# Patient Record
Sex: FEMALE | Race: WHITE | URBAN - METROPOLITAN AREA
[De-identification: names, ages, dates, MRNs, and addresses within clinical notes are randomized per-mention and may not be internally consistent; named-entity substitution may affect disease eponyms.]

---

## 2017-09-30 ENCOUNTER — HISTORICAL (OUTPATIENT)
Dept: ADMINISTRATIVE | Facility: HOSPITAL | Age: 82
End: 2017-09-30

## 2017-09-30 LAB
ABS NEUT (OLG): 6.24 X10(3)/MCL (ref 2.1–9.2)
ALBUMIN SERPL-MCNC: 3.7 GM/DL (ref 3.4–5)
ALBUMIN/GLOB SERPL: 0.9 {RATIO}
ALP SERPL-CCNC: 73 UNIT/L (ref 38–126)
ALT SERPL-CCNC: 17 UNIT/L (ref 12–78)
APPEARANCE, UA: CLEAR
APTT PPP: 34.5 SECOND(S) (ref 24.8–36.9)
AST SERPL-CCNC: 22 UNIT/L (ref 15–37)
BACTERIA SPEC CULT: ABNORMAL /HPF
BASOPHILS # BLD AUTO: 0 X10(3)/MCL (ref 0–0.2)
BASOPHILS NFR BLD AUTO: 0 %
BILIRUB SERPL-MCNC: 0.3 MG/DL (ref 0.2–1)
BILIRUB UR QL STRIP: NEGATIVE
BILIRUBIN DIRECT+TOT PNL SERPL-MCNC: 0.1 MG/DL (ref 0–0.2)
BILIRUBIN DIRECT+TOT PNL SERPL-MCNC: 0.2 MG/DL (ref 0–0.8)
BUN SERPL-MCNC: 22 MG/DL (ref 7–18)
CALCIUM SERPL-MCNC: 8.7 MG/DL (ref 8.5–10.1)
CHLORIDE SERPL-SCNC: 110 MMOL/L (ref 98–107)
CO2 SERPL-SCNC: 26 MMOL/L (ref 21–32)
COLOR UR: YELLOW
CREAT SERPL-MCNC: 0.96 MG/DL (ref 0.55–1.02)
EOSINOPHIL # BLD AUTO: 0.1 X10(3)/MCL (ref 0–0.9)
EOSINOPHIL NFR BLD AUTO: 1 %
ERYTHROCYTE [DISTWIDTH] IN BLOOD BY AUTOMATED COUNT: 15.7 % (ref 11.5–17)
GLOBULIN SER-MCNC: 3.9 GM/DL (ref 2.4–3.5)
GLUCOSE (UA): NEGATIVE
GLUCOSE SERPL-MCNC: 85 MG/DL (ref 74–106)
HCT VFR BLD AUTO: 51.4 % (ref 37–47)
HGB BLD-MCNC: 16.5 GM/DL (ref 12–16)
HGB UR QL STRIP: NEGATIVE
INR PPP: 1.08 (ref 0–1.27)
KETONES UR QL STRIP: NEGATIVE
LEUKOCYTE ESTERASE UR QL STRIP: ABNORMAL
LYMPHOCYTES # BLD AUTO: 1.5 X10(3)/MCL (ref 0.6–4.6)
LYMPHOCYTES NFR BLD AUTO: 17 %
MCH RBC QN AUTO: 28.8 PG (ref 27–31)
MCHC RBC AUTO-ENTMCNC: 32.1 GM/DL (ref 33–36)
MCV RBC AUTO: 89.9 FL (ref 80–94)
MONOCYTES # BLD AUTO: 0.7 X10(3)/MCL (ref 0.1–1.3)
MONOCYTES NFR BLD AUTO: 8 %
NEUTROPHILS # BLD AUTO: 6.24 X10(3)/MCL (ref 1.4–7.9)
NEUTROPHILS NFR BLD AUTO: 73 %
NITRITE UR QL STRIP: NEGATIVE
PH UR STRIP: 7 [PH] (ref 5–9)
PLATELET # BLD AUTO: 260 X10(3)/MCL (ref 130–400)
PMV BLD AUTO: 9.2 FL (ref 9.4–12.4)
POTASSIUM SERPL-SCNC: 4.4 MMOL/L (ref 3.5–5.1)
PROT SERPL-MCNC: 7.6 GM/DL (ref 6.4–8.2)
PROT UR QL STRIP: NEGATIVE
PROTHROMBIN TIME: 13.8 SECOND(S) (ref 12.2–14.7)
RBC # BLD AUTO: 5.72 X10(6)/MCL (ref 4.2–5.4)
RBC #/AREA URNS HPF: ABNORMAL /[HPF]
SODIUM SERPL-SCNC: 144 MMOL/L (ref 136–145)
SP GR UR STRIP: 1.01 (ref 1–1.03)
SQUAMOUS EPITHELIAL, UA: ABNORMAL
TROPONIN I SERPL-MCNC: <0.02 NG/ML (ref 0.02–0.49)
UROBILINOGEN UR STRIP-ACNC: 0.2
WBC # SPEC AUTO: 8.5 X10(3)/MCL (ref 4.5–11.5)
WBC #/AREA URNS HPF: ABNORMAL /[HPF]

## 2017-10-13 ENCOUNTER — HISTORICAL (OUTPATIENT)
Dept: ADMINISTRATIVE | Facility: HOSPITAL | Age: 82
End: 2017-10-13

## 2017-10-13 LAB
ABS NEUT (OLG): 14.32 X10(3)/MCL (ref 2.1–9.2)
ALBUMIN SERPL-MCNC: 3.1 GM/DL (ref 3.4–5)
ALBUMIN/GLOB SERPL: 0.6 {RATIO}
ALP SERPL-CCNC: 96 UNIT/L (ref 38–126)
ALT SERPL-CCNC: 18 UNIT/L (ref 12–78)
AMPHET UR QL SCN: NORMAL
APPEARANCE, UA: ABNORMAL
APTT PPP: 33.7 SECOND(S) (ref 24.8–36.9)
AST SERPL-CCNC: 18 UNIT/L (ref 15–37)
BACTERIA SPEC CULT: ABNORMAL /HPF
BARBITURATE SCN PRESENT UR: NORMAL
BASOPHILS # BLD AUTO: 0 X10(3)/MCL (ref 0–0.2)
BASOPHILS NFR BLD AUTO: 0 %
BENZODIAZ UR QL SCN: NORMAL
BILIRUB SERPL-MCNC: 0.8 MG/DL (ref 0.2–1)
BILIRUB UR QL STRIP: ABNORMAL
BILIRUBIN DIRECT+TOT PNL SERPL-MCNC: 0.3 MG/DL (ref 0–0.8)
BILIRUBIN DIRECT+TOT PNL SERPL-MCNC: 0.5 MG/DL (ref 0–0.2)
BUN SERPL-MCNC: 28 MG/DL (ref 7–18)
CALCIUM SERPL-MCNC: 8.6 MG/DL (ref 8.5–10.1)
CANNABINOIDS UR QL SCN: NORMAL
CHLORIDE SERPL-SCNC: 107 MMOL/L (ref 98–107)
CK MB SERPL-MCNC: 2.4 NG/ML (ref 0.5–3.6)
CK SERPL-CCNC: 107 UNIT/L (ref 26–192)
CO2 SERPL-SCNC: 18 MMOL/L (ref 21–32)
COCAINE UR QL SCN: NORMAL
COLOR UR: ABNORMAL
CREAT SERPL-MCNC: 1.2 MG/DL (ref 0.55–1.02)
EOSINOPHIL # BLD AUTO: 0 X10(3)/MCL (ref 0–0.9)
EOSINOPHIL NFR BLD AUTO: 0 %
ERYTHROCYTE [DISTWIDTH] IN BLOOD BY AUTOMATED COUNT: 15.1 % (ref 11.5–17)
GLOBULIN SER-MCNC: 5.2 GM/DL (ref 2.4–3.5)
GLUCOSE (UA): NEGATIVE
GLUCOSE SERPL-MCNC: 103 MG/DL (ref 74–106)
HCT VFR BLD AUTO: 46.1 % (ref 37–47)
HGB BLD-MCNC: 14.7 GM/DL (ref 12–16)
HGB UR QL STRIP: NEGATIVE
INR PPP: 1.27 (ref 0–1.27)
KETONES UR QL STRIP: NEGATIVE
LACTATE SERPL-SCNC: 1.1 MMOL/L (ref 0.4–2)
LEUKOCYTE ESTERASE UR QL STRIP: ABNORMAL
LYMPHOCYTES # BLD AUTO: 1.5 X10(3)/MCL (ref 0.6–4.6)
LYMPHOCYTES NFR BLD AUTO: 9 %
MAGNESIUM SERPL-MCNC: 2.2 MG/DL (ref 1.8–2.4)
MCH RBC QN AUTO: 28.1 PG (ref 27–31)
MCHC RBC AUTO-ENTMCNC: 31.9 GM/DL (ref 33–36)
MCV RBC AUTO: 88.1 FL (ref 80–94)
MONOCYTES # BLD AUTO: 1.8 X10(3)/MCL (ref 0.1–1.3)
MONOCYTES NFR BLD AUTO: 10 %
NEUTROPHILS # BLD AUTO: 14.32 X10(3)/MCL (ref 2.1–9.2)
NEUTROPHILS NFR BLD AUTO: 80 %
NITRITE UR QL STRIP: NEGATIVE
OPIATES UR QL SCN: NORMAL
PCP UR QL: NORMAL
PH UR STRIP.AUTO: 5.5 [PH] (ref 5–7.5)
PH UR STRIP: 5.5 [PH] (ref 5–9)
PLATELET # BLD AUTO: 274 X10(3)/MCL (ref 130–400)
PMV BLD AUTO: 11.1 FL (ref 9.4–12.4)
POC TROPONIN: 0 NG/ML (ref 0–0.08)
POTASSIUM SERPL-SCNC: 4.2 MMOL/L (ref 3.5–5.1)
PROT SERPL-MCNC: 8.3 GM/DL (ref 6.4–8.2)
PROT UR QL STRIP: ABNORMAL
PROTHROMBIN TIME: 15.7 SECOND(S) (ref 12.2–14.7)
RBC # BLD AUTO: 5.23 X10(6)/MCL (ref 4.2–5.4)
RBC #/AREA URNS HPF: 5 /HPF (ref 0–2)
SODIUM SERPL-SCNC: 136 MMOL/L (ref 136–145)
SP GR FLD REFRACTOMETRY: 1.02 (ref 1–1.03)
SP GR UR STRIP: 1.02 (ref 1–1.03)
SQUAMOUS EPITHELIAL, UA: 12 /HPF (ref 0–4)
TROPONIN I SERPL-MCNC: 0.02 NG/ML (ref 0.02–0.49)
TSH SERPL-ACNC: 2.31 MIU/ML (ref 0.36–3.74)
UROBILINOGEN UR STRIP-ACNC: 2
WBC # SPEC AUTO: 17.8 X10(3)/MCL (ref 4.5–11.5)
WBC #/AREA URNS HPF: 15 /HPF (ref 0–3)

## 2017-10-14 LAB
CHOLEST SERPL-MCNC: 182 MG/DL (ref 0–200)
CHOLEST/HDLC SERPL: 5.2 {RATIO} (ref 0–4)
CK MB SERPL-MCNC: 2.5 NG/ML (ref 0.5–3.6)
CK SERPL-CCNC: 89 UNIT/L (ref 26–192)
CRP SERPL HS-MCNC: >190 MG/L (ref 0–3)
ERYTHROCYTE [SEDIMENTATION RATE] IN BLOOD: 67 MM/HR (ref 0–20)
EST. AVERAGE GLUCOSE BLD GHB EST-MCNC: 111 MG/DL
HBA1C MFR BLD: 5.5 % (ref 4.2–6.3)
HCO3 UR-SCNC: 19.6 MMOL/L (ref 22–26)
HDLC SERPL-MCNC: 35 MG/DL (ref 35–60)
LDLC SERPL CALC-MCNC: 123 MG/DL (ref 0–129)
MAGNESIUM SERPL-MCNC: 2.2 MG/DL (ref 1.8–2.4)
O2 HGB ARTERIAL: 87.6 % (ref 94–97)
PCO2 BLDA: 38 MMHG (ref 35–45)
PH SMN: 7.32 [PH] (ref 7.35–7.45)
PO2 BLDA: 51 MMHG (ref 80–100)
POC ALLENS TEST: ABNORMAL
POC BE: -6 (ref -2–2)
POC CAO2: 17.9 ML/DL (ref 15.7–21.6)
POC CO HGB: 2.7 %
POC CO2: 20.8 MMOL/L (ref 22–27)
POC IONIZED CALCIUM: 1.09 MMOL/L (ref 1.12–1.23)
POC MET HGB: 1.2 % (ref 0.4–1.5)
POC SAMPLESOURCE: ABNORMAL
POC SATURATED O2: 82.3 % (ref 95–98)
POC SITE: ABNORMAL
POC THB: 14.6 GM/DL (ref 12–16)
POC TREATMENT: ABNORMAL
POTASSIUM BLD-SCNC: 4.3 MMOL/L (ref 3.6–5)
SETTING 1 ABG: ABNORMAL
SETTING 2 ABG: ABNORMAL
SODIUM BLD-SCNC: 138 MMOL/L (ref 137–145)
TRIGL SERPL-MCNC: 122 MG/DL (ref 30–150)
TROPONIN I SERPL-MCNC: 0.09 NG/ML (ref 0.02–0.49)
VLDLC SERPL CALC-MCNC: 24 MG/DL

## 2017-10-16 LAB
ABS NEUT (OLG): 11.42 X10(3)/MCL (ref 2.1–9.2)
BASOPHILS # BLD AUTO: 0 X10(3)/MCL (ref 0–0.2)
BASOPHILS NFR BLD AUTO: 0 %
BUN SERPL-MCNC: 30 MG/DL (ref 7–18)
CALCIUM SERPL-MCNC: 8.9 MG/DL (ref 8.5–10.1)
CHLORIDE SERPL-SCNC: 108 MMOL/L (ref 98–107)
CO2 SERPL-SCNC: 23 MMOL/L (ref 21–32)
CREAT SERPL-MCNC: 0.82 MG/DL (ref 0.55–1.02)
ERYTHROCYTE [DISTWIDTH] IN BLOOD BY AUTOMATED COUNT: 14.5 % (ref 11.5–17)
FINAL CULTURE: NO GROWTH
GLUCOSE SERPL-MCNC: 123 MG/DL (ref 74–106)
HCT VFR BLD AUTO: 45.3 % (ref 37–47)
HGB BLD-MCNC: 14.7 GM/DL (ref 12–16)
LYMPHOCYTES # BLD AUTO: 0.9 X10(3)/MCL (ref 0.6–4.6)
LYMPHOCYTES NFR BLD AUTO: 7 %
MCH RBC QN AUTO: 29.2 PG (ref 27–31)
MCHC RBC AUTO-ENTMCNC: 32.5 GM/DL (ref 33–36)
MCV RBC AUTO: 89.9 FL (ref 80–94)
MONOCYTES # BLD AUTO: 0.3 X10(3)/MCL (ref 0.1–1.3)
MONOCYTES NFR BLD AUTO: 2 %
NEUTROPHILS # BLD AUTO: 11.42 X10(3)/MCL (ref 1.4–7.9)
NEUTROPHILS NFR BLD AUTO: 90 %
PLATELET # BLD AUTO: 296 X10(3)/MCL (ref 130–400)
PMV BLD AUTO: 10.3 FL (ref 9.4–12.4)
POTASSIUM SERPL-SCNC: 4.3 MMOL/L (ref 3.5–5.1)
RBC # BLD AUTO: 5.04 X10(6)/MCL (ref 4.2–5.4)
SODIUM SERPL-SCNC: 142 MMOL/L (ref 136–145)
WBC # SPEC AUTO: 12.8 X10(3)/MCL (ref 4.5–11.5)

## 2017-10-26 ENCOUNTER — HISTORICAL (OUTPATIENT)
Dept: ADMINISTRATIVE | Facility: HOSPITAL | Age: 82
End: 2017-10-26

## 2017-12-09 LAB
INFLUENZA A ANTIGEN, POC: POSITIVE
INFLUENZA B ANTIGEN, POC: NEGATIVE

## 2018-01-30 ENCOUNTER — HISTORICAL (OUTPATIENT)
Dept: ADMINISTRATIVE | Facility: HOSPITAL | Age: 83
End: 2018-01-30

## 2018-01-30 LAB — TSH SERPL-ACNC: 2.38 MIU/ML (ref 0.36–3.74)

## 2018-02-04 ENCOUNTER — HISTORICAL (OUTPATIENT)
Dept: ADMINISTRATIVE | Facility: HOSPITAL | Age: 83
End: 2018-02-04

## 2018-02-10 ENCOUNTER — HISTORICAL (OUTPATIENT)
Dept: ADMINISTRATIVE | Facility: HOSPITAL | Age: 83
End: 2018-02-10

## 2018-04-16 ENCOUNTER — HISTORICAL (OUTPATIENT)
Dept: ADMINISTRATIVE | Facility: HOSPITAL | Age: 83
End: 2018-04-16

## 2018-04-16 LAB — POC CREATININE: 1 MG/DL (ref 0.6–1.3)

## 2018-07-24 ENCOUNTER — HISTORICAL (OUTPATIENT)
Dept: ADMINISTRATIVE | Facility: HOSPITAL | Age: 83
End: 2018-07-24

## 2018-07-24 LAB
ABS NEUT (OLG): 7.33 X10(3)/MCL (ref 2.1–9.2)
ALBUMIN SERPL-MCNC: 3.8 GM/DL (ref 3.4–5)
ALBUMIN/GLOB SERPL: 1.1 RATIO (ref 1.1–2)
ALP SERPL-CCNC: 70 UNIT/L (ref 38–126)
ALT SERPL-CCNC: 16 UNIT/L (ref 12–78)
APPEARANCE, UA: CLEAR
AST SERPL-CCNC: 17 UNIT/L (ref 15–37)
BACTERIA SPEC CULT: ABNORMAL /HPF
BASOPHILS # BLD AUTO: 0 X10(3)/MCL (ref 0–0.2)
BASOPHILS NFR BLD AUTO: 0 %
BILIRUB SERPL-MCNC: 1.2 MG/DL (ref 0.2–1)
BILIRUB UR QL STRIP: NEGATIVE
BILIRUBIN DIRECT+TOT PNL SERPL-MCNC: 0.1 MG/DL (ref 0–0.5)
BILIRUBIN DIRECT+TOT PNL SERPL-MCNC: 1.1 MG/DL (ref 0–0.8)
BUN SERPL-MCNC: 19 MG/DL (ref 7–18)
CALCIUM SERPL-MCNC: 9 MG/DL (ref 8.5–10.1)
CHLORIDE SERPL-SCNC: 107 MMOL/L (ref 98–107)
CO2 SERPL-SCNC: 27 MMOL/L (ref 21–32)
COLOR UR: YELLOW
CREAT SERPL-MCNC: 0.92 MG/DL (ref 0.55–1.02)
EOSINOPHIL # BLD AUTO: 0.1 X10(3)/MCL (ref 0–0.9)
EOSINOPHIL NFR BLD AUTO: 1 %
ERYTHROCYTE [DISTWIDTH] IN BLOOD BY AUTOMATED COUNT: 15.9 % (ref 11.5–17)
GLOBULIN SER-MCNC: 3.5 GM/DL (ref 2.4–3.5)
GLUCOSE (UA): NEGATIVE
GLUCOSE SERPL-MCNC: 101 MG/DL (ref 74–106)
HCT VFR BLD AUTO: 46.5 % (ref 37–47)
HGB BLD-MCNC: 14.4 GM/DL (ref 12–16)
HGB UR QL STRIP: NEGATIVE
KETONES UR QL STRIP: NEGATIVE
LEUKOCYTE ESTERASE UR QL STRIP: ABNORMAL
LYMPHOCYTES # BLD AUTO: 1.8 X10(3)/MCL (ref 0.6–4.6)
LYMPHOCYTES NFR BLD AUTO: 18 %
MCH RBC QN AUTO: 27.1 PG (ref 27–31)
MCHC RBC AUTO-ENTMCNC: 31 GM/DL (ref 33–36)
MCV RBC AUTO: 87.4 FL (ref 80–94)
MONOCYTES # BLD AUTO: 0.9 X10(3)/MCL (ref 0.1–1.3)
MONOCYTES NFR BLD AUTO: 8 %
NEUTROPHILS # BLD AUTO: 7.33 X10(3)/MCL (ref 1.4–7.9)
NEUTROPHILS NFR BLD AUTO: 72 %
NITRITE UR QL STRIP: NEGATIVE
PH UR STRIP: 5 [PH] (ref 5–9)
PLATELET # BLD AUTO: 283 X10(3)/MCL (ref 130–400)
PMV BLD AUTO: 10.4 FL (ref 9.4–12.4)
POTASSIUM SERPL-SCNC: 4.1 MMOL/L (ref 3.5–5.1)
PROT SERPL-MCNC: 7.3 GM/DL (ref 6.4–8.2)
PROT UR QL STRIP: NEGATIVE
RBC # BLD AUTO: 5.32 X10(6)/MCL (ref 4.2–5.4)
RBC #/AREA URNS HPF: ABNORMAL /[HPF]
SODIUM SERPL-SCNC: 142 MMOL/L (ref 136–145)
SP GR UR STRIP: 1.01 (ref 1–1.03)
SQUAMOUS EPITHELIAL, UA: ABNORMAL
TSH SERPL-ACNC: 0.5 MIU/L (ref 0.36–3.74)
UROBILINOGEN UR STRIP-ACNC: 0.2
WBC # SPEC AUTO: 10.2 X10(3)/MCL (ref 4.5–11.5)
WBC #/AREA URNS HPF: ABNORMAL /HPF

## 2018-12-31 ENCOUNTER — HISTORICAL (OUTPATIENT)
Dept: ADMINISTRATIVE | Facility: HOSPITAL | Age: 83
End: 2018-12-31

## 2018-12-31 LAB
ABS NEUT (OLG): 17.08 X10(3)/MCL (ref 2.1–9.2)
ALBUMIN SERPL-MCNC: 3.5 GM/DL (ref 3.4–5)
ALBUMIN/GLOB SERPL: 0.9 RATIO (ref 1.1–2)
ALP SERPL-CCNC: 75 UNIT/L (ref 38–126)
ALT SERPL-CCNC: 13 UNIT/L (ref 12–78)
AST SERPL-CCNC: 14 UNIT/L (ref 15–37)
BASOPHILS # BLD AUTO: 0 X10(3)/MCL (ref 0–0.2)
BASOPHILS NFR BLD AUTO: 0 %
BILIRUB SERPL-MCNC: 0.8 MG/DL (ref 0.2–1)
BILIRUBIN DIRECT+TOT PNL SERPL-MCNC: 0.2 MG/DL (ref 0–0.5)
BILIRUBIN DIRECT+TOT PNL SERPL-MCNC: 0.6 MG/DL (ref 0–0.8)
BNP BLD-MCNC: 527 PG/ML (ref 0–125)
BUN SERPL-MCNC: 22 MG/DL (ref 7–18)
CALCIUM SERPL-MCNC: 8.7 MG/DL (ref 8.5–10.1)
CHLORIDE SERPL-SCNC: 105 MMOL/L (ref 98–107)
CO2 SERPL-SCNC: 26 MMOL/L (ref 21–32)
CREAT SERPL-MCNC: 1.16 MG/DL (ref 0.55–1.02)
ERYTHROCYTE [DISTWIDTH] IN BLOOD BY AUTOMATED COUNT: 18 % (ref 11.5–17)
GLOBULIN SER-MCNC: 3.9 GM/DL (ref 2.4–3.5)
GLUCOSE SERPL-MCNC: 108 MG/DL (ref 74–106)
HCT VFR BLD AUTO: 44.9 % (ref 37–47)
HGB BLD-MCNC: 13.8 GM/DL (ref 12–16)
LACTATE SERPL-SCNC: 1.5 MMOL/L (ref 0.4–2)
LYMPHOCYTES # BLD AUTO: 2.4 X10(3)/MCL (ref 0.6–4.6)
LYMPHOCYTES NFR BLD AUTO: 11 %
MCH RBC QN AUTO: 25.3 PG (ref 27–31)
MCHC RBC AUTO-ENTMCNC: 30.7 GM/DL (ref 33–36)
MCV RBC AUTO: 82.2 FL (ref 80–94)
MONOCYTES # BLD AUTO: 1.8 X10(3)/MCL (ref 0.1–1.3)
MONOCYTES NFR BLD AUTO: 8 %
NEUTROPHILS # BLD AUTO: 17.08 X10(3)/MCL (ref 2.1–9.2)
NEUTROPHILS NFR BLD AUTO: 79 %
PLATELET # BLD AUTO: 238 X10(3)/MCL (ref 130–400)
PMV BLD AUTO: 9.5 FL (ref 9.4–12.4)
POTASSIUM SERPL-SCNC: 3.4 MMOL/L (ref 3.5–5.1)
PROT SERPL-MCNC: 7.4 GM/DL (ref 6.4–8.2)
RBC # BLD AUTO: 5.46 X10(6)/MCL (ref 4.2–5.4)
SODIUM SERPL-SCNC: 139 MMOL/L (ref 136–145)
TROPONIN I SERPL-MCNC: <0.02 NG/ML (ref 0.02–0.49)
WBC # SPEC AUTO: 21.5 X10(3)/MCL (ref 4.5–11.5)

## 2019-01-01 LAB
ABS NEUT (OLG): 13.43 X10(3)/MCL (ref 2.1–9.2)
BASOPHILS # BLD AUTO: 0 X10(3)/MCL (ref 0–0.2)
BASOPHILS NFR BLD AUTO: 0 %
BUN SERPL-MCNC: 23 MG/DL (ref 7–18)
CALCIUM SERPL-MCNC: 8.4 MG/DL (ref 8.5–10.1)
CHLORIDE SERPL-SCNC: 110 MMOL/L (ref 98–107)
CO2 SERPL-SCNC: 22 MMOL/L (ref 21–32)
CREAT SERPL-MCNC: 0.88 MG/DL (ref 0.55–1.02)
CREAT/UREA NIT SERPL: 26.1
ERYTHROCYTE [DISTWIDTH] IN BLOOD BY AUTOMATED COUNT: 17.6 % (ref 11.5–17)
FLUAV AG UPPER RESP QL IA.RAPID: NEGATIVE
FLUBV AG UPPER RESP QL IA.RAPID: NEGATIVE
GLUCOSE SERPL-MCNC: 133 MG/DL (ref 74–106)
HCT VFR BLD AUTO: 44.5 % (ref 37–47)
HGB BLD-MCNC: 13.4 GM/DL (ref 12–16)
LYMPHOCYTES # BLD AUTO: 0.9 X10(3)/MCL (ref 0.6–4.6)
LYMPHOCYTES NFR BLD AUTO: 6 %
MCH RBC QN AUTO: 24.9 PG (ref 27–31)
MCHC RBC AUTO-ENTMCNC: 30.1 GM/DL (ref 33–36)
MCV RBC AUTO: 82.7 FL (ref 80–94)
MONOCYTES # BLD AUTO: 0.3 X10(3)/MCL (ref 0.1–1.3)
MONOCYTES NFR BLD AUTO: 2 %
NEUTROPHILS # BLD AUTO: 13.43 X10(3)/MCL (ref 2.1–9.2)
NEUTROPHILS NFR BLD AUTO: 91 %
PLATELET # BLD AUTO: 218 X10(3)/MCL (ref 130–400)
PMV BLD AUTO: 9.5 FL (ref 9.4–12.4)
POTASSIUM SERPL-SCNC: 4.2 MMOL/L (ref 3.5–5.1)
RBC # BLD AUTO: 5.38 X10(6)/MCL (ref 4.2–5.4)
SODIUM SERPL-SCNC: 141 MMOL/L (ref 136–145)
WBC # SPEC AUTO: 14.7 X10(3)/MCL (ref 4.5–11.5)

## 2019-01-02 LAB
ABS NEUT (OLG): 12.1 X10(3)/MCL (ref 2.1–9.2)
BASOPHILS # BLD AUTO: 0 X10(3)/MCL (ref 0–0.2)
BASOPHILS NFR BLD AUTO: 0 %
BUN SERPL-MCNC: 27 MG/DL (ref 7–18)
CALCIUM SERPL-MCNC: 8.1 MG/DL (ref 8.5–10.1)
CHLORIDE SERPL-SCNC: 111 MMOL/L (ref 98–107)
CO2 SERPL-SCNC: 24 MMOL/L (ref 21–32)
CREAT SERPL-MCNC: 0.87 MG/DL (ref 0.55–1.02)
CREAT/UREA NIT SERPL: 31
ERYTHROCYTE [DISTWIDTH] IN BLOOD BY AUTOMATED COUNT: 17.5 % (ref 11.5–17)
GLUCOSE SERPL-MCNC: 158 MG/DL (ref 74–106)
HCT VFR BLD AUTO: 39.7 % (ref 37–47)
HGB BLD-MCNC: 12.2 GM/DL (ref 12–16)
LYMPHOCYTES # BLD AUTO: 0.8 X10(3)/MCL (ref 0.6–4.6)
LYMPHOCYTES NFR BLD AUTO: 6 %
MCH RBC QN AUTO: 25.1 PG (ref 27–31)
MCHC RBC AUTO-ENTMCNC: 30.7 GM/DL (ref 33–36)
MCV RBC AUTO: 81.7 FL (ref 80–94)
MONOCYTES # BLD AUTO: 0.4 X10(3)/MCL (ref 0.1–1.3)
MONOCYTES NFR BLD AUTO: 3 %
NEUTROPHILS # BLD AUTO: 12.1 X10(3)/MCL (ref 2.1–9.2)
NEUTROPHILS NFR BLD AUTO: 90 %
PLATELET # BLD AUTO: 255 X10(3)/MCL (ref 130–400)
PMV BLD AUTO: 10.7 FL (ref 9.4–12.4)
POTASSIUM SERPL-SCNC: 3.6 MMOL/L (ref 3.5–5.1)
RBC # BLD AUTO: 4.86 X10(6)/MCL (ref 4.2–5.4)
SODIUM SERPL-SCNC: 142 MMOL/L (ref 136–145)
WBC # SPEC AUTO: 13.4 X10(3)/MCL (ref 4.5–11.5)

## 2019-01-06 LAB
FINAL CULTURE: NORMAL
FINAL CULTURE: NORMAL

## 2019-01-24 ENCOUNTER — HISTORICAL (OUTPATIENT)
Dept: ADMINISTRATIVE | Facility: HOSPITAL | Age: 84
End: 2019-01-24

## 2019-01-24 LAB
ABS NEUT (OLG): 4.79 X10(3)/MCL (ref 2.1–9.2)
ALBUMIN SERPL-MCNC: 3.5 GM/DL (ref 3.4–5)
ALBUMIN/GLOB SERPL: 1.1 RATIO (ref 1.1–2)
ALP SERPL-CCNC: 73 UNIT/L (ref 38–126)
ALT SERPL-CCNC: 14 UNIT/L (ref 12–78)
ANISOCYTOSIS BLD QL SMEAR: 1
AST SERPL-CCNC: 12 UNIT/L (ref 15–37)
BASOPHILS # BLD AUTO: 0 X10(3)/MCL (ref 0–0.2)
BASOPHILS NFR BLD AUTO: 0 %
BILIRUB SERPL-MCNC: 0.6 MG/DL (ref 0.2–1)
BILIRUBIN DIRECT+TOT PNL SERPL-MCNC: 0.1 MG/DL (ref 0–0.5)
BILIRUBIN DIRECT+TOT PNL SERPL-MCNC: 0.5 MG/DL (ref 0–0.8)
BUN SERPL-MCNC: 24 MG/DL (ref 7–18)
BURR CELLS BLD QL SMEAR: 1
CALCIUM SERPL-MCNC: 8.3 MG/DL (ref 8.5–10.1)
CHLORIDE SERPL-SCNC: 107 MMOL/L (ref 98–107)
CO2 SERPL-SCNC: 27 MMOL/L (ref 21–32)
CREAT SERPL-MCNC: 0.96 MG/DL (ref 0.55–1.02)
EOSINOPHIL # BLD AUTO: 0.2 X10(3)/MCL (ref 0–0.9)
EOSINOPHIL NFR BLD AUTO: 2 %
ERYTHROCYTE [DISTWIDTH] IN BLOOD BY AUTOMATED COUNT: 19.1 % (ref 11.5–17)
GLOBULIN SER-MCNC: 3.2 GM/DL (ref 2.4–3.5)
GLUCOSE SERPL-MCNC: 86 MG/DL (ref 74–106)
HCT VFR BLD AUTO: 44.9 % (ref 37–47)
HGB BLD-MCNC: 13.2 GM/DL (ref 12–16)
HYPOCHROMIA BLD QL SMEAR: 0
LYMPHOCYTES # BLD AUTO: 1.7 X10(3)/MCL (ref 0.6–4.6)
LYMPHOCYTES NFR BLD AUTO: 23 % (ref 13–40)
MACROCYTES BLD QL SMEAR: 0
MCH RBC QN AUTO: 25.3 PG (ref 27–31)
MCHC RBC AUTO-ENTMCNC: 29.4 GM/DL (ref 33–36)
MCV RBC AUTO: 86 FL (ref 80–94)
MICROCYTES BLD QL SMEAR: 0
MONOCYTES # BLD AUTO: 0.8 X10(3)/MCL (ref 0.1–1.3)
MONOCYTES NFR BLD AUTO: 11 %
NEUTROPHILS # BLD AUTO: 4.79 X10(3)/MCL (ref 2.1–9.2)
NEUTROPHILS NFR BLD AUTO: 64 %
PLATELET # BLD AUTO: 260 X10(3)/MCL (ref 130–400)
PLATELET # BLD EST: NORMAL 10*3/UL
PMV BLD AUTO: 10.5 FL (ref 7.4–10.4)
POIKILOCYTOSIS BLD QL SMEAR: 1
POLYCHROMASIA BLD QL SMEAR: 0
POTASSIUM SERPL-SCNC: 4.4 MMOL/L (ref 3.5–5.1)
PROT SERPL-MCNC: 6.7 GM/DL (ref 6.4–8.2)
RBC # BLD AUTO: 5.22 X10(6)/MCL (ref 4.2–5.4)
SODIUM SERPL-SCNC: 141 MMOL/L (ref 136–145)
TSH SERPL-ACNC: 2.42 MIU/L (ref 0.36–3.74)
WBC # SPEC AUTO: 7.5 X10(3)/MCL (ref 4.5–11.5)

## 2019-02-11 ENCOUNTER — HISTORICAL (OUTPATIENT)
Dept: ADMINISTRATIVE | Facility: HOSPITAL | Age: 84
End: 2019-02-11

## 2019-02-11 LAB
ABS NEUT (OLG): 8.88 X10(3)/MCL (ref 2.1–9.2)
ALBUMIN SERPL-MCNC: 3.2 GM/DL (ref 3.4–5)
ALBUMIN/GLOB SERPL: 0.9 RATIO (ref 1.1–2)
ALP SERPL-CCNC: 69 UNIT/L (ref 38–126)
ALT SERPL-CCNC: 13 UNIT/L (ref 12–78)
ANISOCYTOSIS BLD QL SMEAR: 1
AST SERPL-CCNC: 18 UNIT/L (ref 15–37)
BASOPHILS # BLD AUTO: 0 X10(3)/MCL (ref 0–0.2)
BASOPHILS NFR BLD AUTO: 0 %
BILIRUB SERPL-MCNC: 0.4 MG/DL (ref 0.2–1)
BILIRUBIN DIRECT+TOT PNL SERPL-MCNC: 0.1 MG/DL (ref 0–0.5)
BILIRUBIN DIRECT+TOT PNL SERPL-MCNC: 0.3 MG/DL (ref 0–0.8)
BNP BLD-MCNC: 286 PG/ML (ref 0–125)
BUN SERPL-MCNC: 16 MG/DL (ref 7–18)
CALCIUM SERPL-MCNC: 8.5 MG/DL (ref 8.5–10.1)
CHLORIDE SERPL-SCNC: 109 MMOL/L (ref 98–107)
CO2 SERPL-SCNC: 25 MMOL/L (ref 21–32)
CREAT SERPL-MCNC: 0.81 MG/DL (ref 0.55–1.02)
D DIMER PPP IA.FEU-MCNC: 847 NG/ML FEU (ref 0–500)
EOSINOPHIL # BLD AUTO: 0.2 X10(3)/MCL (ref 0–0.9)
EOSINOPHIL NFR BLD AUTO: 1 %
ERYTHROCYTE [DISTWIDTH] IN BLOOD BY AUTOMATED COUNT: 18.6 % (ref 11.5–17)
GLOBULIN SER-MCNC: 3.4 GM/DL (ref 2.4–3.5)
GLUCOSE SERPL-MCNC: 84 MG/DL (ref 74–106)
HCT VFR BLD AUTO: 43.1 % (ref 37–47)
HGB BLD-MCNC: 12.7 GM/DL (ref 12–16)
LYMPHOCYTES # BLD AUTO: 2.3 X10(3)/MCL (ref 0.6–4.6)
LYMPHOCYTES NFR BLD AUTO: 18 % (ref 13–40)
MCH RBC QN AUTO: 25.6 PG (ref 27–31)
MCHC RBC AUTO-ENTMCNC: 29.5 GM/DL (ref 33–36)
MCV RBC AUTO: 86.7 FL (ref 80–94)
MONOCYTES # BLD AUTO: 1.7 X10(3)/MCL (ref 0.1–1.3)
MONOCYTES NFR BLD AUTO: 13 %
NEUTROPHILS # BLD AUTO: 8.88 X10(3)/MCL (ref 2.1–9.2)
NEUTROPHILS NFR BLD AUTO: 67 %
PLATELET # BLD AUTO: 292 X10(3)/MCL (ref 130–400)
PLATELET # BLD EST: NORMAL 10*3/UL
PMV BLD AUTO: 10.1 FL (ref 7.4–10.4)
POTASSIUM SERPL-SCNC: 4.5 MMOL/L (ref 3.5–5.1)
PROT SERPL-MCNC: 6.6 GM/DL (ref 6.4–8.2)
RBC # BLD AUTO: 4.97 X10(6)/MCL (ref 4.2–5.4)
SODIUM SERPL-SCNC: 141 MMOL/L (ref 136–145)
WBC # SPEC AUTO: 13.2 X10(3)/MCL (ref 4.5–11.5)

## 2019-02-12 ENCOUNTER — HISTORICAL (OUTPATIENT)
Dept: ADMINISTRATIVE | Facility: HOSPITAL | Age: 84
End: 2019-02-12

## 2019-02-22 ENCOUNTER — HISTORICAL (OUTPATIENT)
Dept: ADMINISTRATIVE | Facility: HOSPITAL | Age: 84
End: 2019-02-22

## 2019-02-22 LAB
ABS NEUT (OLG): 10.5 X10(3)/MCL (ref 2.1–9.2)
ALBUMIN SERPL-MCNC: 3.3 GM/DL (ref 3.4–5)
ALBUMIN/GLOB SERPL: 0.9 {RATIO}
ALP SERPL-CCNC: 63 UNIT/L (ref 38–126)
ALT SERPL-CCNC: 16 UNIT/L (ref 12–78)
AST SERPL-CCNC: 21 UNIT/L (ref 15–37)
BASOPHILS # BLD AUTO: 0.1 X10(3)/MCL (ref 0–0.2)
BASOPHILS NFR BLD AUTO: 0 %
BILIRUB SERPL-MCNC: 0.5 MG/DL (ref 0.2–1)
BILIRUBIN DIRECT+TOT PNL SERPL-MCNC: 0.1 MG/DL (ref 0–0.2)
BILIRUBIN DIRECT+TOT PNL SERPL-MCNC: 0.4 MG/DL (ref 0–0.8)
BNP BLD-MCNC: 161 PG/ML (ref 0–125)
BNP BLD-MCNC: 287 PG/ML (ref 0–100)
BUN SERPL-MCNC: 19 MG/DL (ref 7–18)
CALCIUM SERPL-MCNC: 8.2 MG/DL (ref 8.5–10.1)
CHLORIDE SERPL-SCNC: 103 MMOL/L (ref 98–107)
CO2 SERPL-SCNC: 26 MMOL/L (ref 21–32)
CREAT SERPL-MCNC: 0.99 MG/DL (ref 0.55–1.02)
EOSINOPHIL # BLD AUTO: 0.9 X10(3)/MCL (ref 0–0.9)
EOSINOPHIL NFR BLD AUTO: 6 %
ERYTHROCYTE [DISTWIDTH] IN BLOOD BY AUTOMATED COUNT: 17.8 % (ref 11.5–17)
GLOBULIN SER-MCNC: 3.7 GM/DL (ref 2.4–3.5)
GLUCOSE SERPL-MCNC: 91 MG/DL (ref 74–106)
HCT VFR BLD AUTO: 43.7 % (ref 37–47)
HGB BLD-MCNC: 13.4 GM/DL (ref 12–16)
LYMPHOCYTES # BLD AUTO: 1.5 X10(3)/MCL (ref 0.6–4.6)
LYMPHOCYTES NFR BLD AUTO: 10 %
MCH RBC QN AUTO: 25.3 PG (ref 27–31)
MCHC RBC AUTO-ENTMCNC: 30.7 GM/DL (ref 33–36)
MCV RBC AUTO: 82.6 FL (ref 80–94)
MONOCYTES # BLD AUTO: 1.8 X10(3)/MCL (ref 0.1–1.3)
MONOCYTES NFR BLD AUTO: 12 %
NEUTROPHILS # BLD AUTO: 10.5 X10(3)/MCL (ref 2.1–9.2)
NEUTROPHILS NFR BLD AUTO: 71 %
PLATELET # BLD AUTO: 282 X10(3)/MCL (ref 130–400)
PMV BLD AUTO: 9.5 FL (ref 9.4–12.4)
POC TROPONIN: 0.01 NG/ML (ref 0–0.08)
POTASSIUM SERPL-SCNC: 4.3 MMOL/L (ref 3.5–5.1)
PROT SERPL-MCNC: 7 GM/DL (ref 6.4–8.2)
RBC # BLD AUTO: 5.29 X10(6)/MCL (ref 4.2–5.4)
SODIUM SERPL-SCNC: 136 MMOL/L (ref 136–145)
WBC # SPEC AUTO: 14.9 X10(3)/MCL (ref 4.5–11.5)

## 2019-02-28 LAB
FINAL CULTURE: NORMAL
FINAL CULTURE: NORMAL

## 2019-03-10 ENCOUNTER — HISTORICAL (OUTPATIENT)
Dept: ADMINISTRATIVE | Facility: HOSPITAL | Age: 84
End: 2019-03-10

## 2019-03-10 LAB
ABS NEUT (OLG): 11.75 X10(3)/MCL (ref 2.1–9.2)
ALBUMIN SERPL-MCNC: 3.1 GM/DL (ref 3.4–5)
ALBUMIN/GLOB SERPL: 0.8 {RATIO}
ALP SERPL-CCNC: 60 UNIT/L (ref 38–126)
ALT SERPL-CCNC: 18 UNIT/L (ref 12–78)
APPEARANCE, UA: CLEAR
AST SERPL-CCNC: 14 UNIT/L (ref 15–37)
BACTERIA SPEC CULT: ABNORMAL /HPF
BASOPHILS # BLD AUTO: 0 X10(3)/MCL (ref 0–0.2)
BASOPHILS NFR BLD AUTO: 0 %
BILIRUB SERPL-MCNC: 0.3 MG/DL (ref 0.2–1)
BILIRUB UR QL STRIP: NEGATIVE
BILIRUBIN DIRECT+TOT PNL SERPL-MCNC: 0.1 MG/DL (ref 0–0.2)
BILIRUBIN DIRECT+TOT PNL SERPL-MCNC: 0.2 MG/DL (ref 0–0.8)
BNP BLD-MCNC: 301 PG/ML (ref 0–100)
BUN SERPL-MCNC: 14 MG/DL (ref 7–18)
CALCIUM SERPL-MCNC: 8 MG/DL (ref 8.5–10.1)
CHLORIDE SERPL-SCNC: 106 MMOL/L (ref 98–107)
CO2 SERPL-SCNC: 27 MMOL/L (ref 21–32)
COLOR UR: YELLOW
CREAT SERPL-MCNC: 0.95 MG/DL (ref 0.55–1.02)
EOSINOPHIL # BLD AUTO: 0.4 X10(3)/MCL (ref 0–0.9)
EOSINOPHIL NFR BLD AUTO: 3 %
ERYTHROCYTE [DISTWIDTH] IN BLOOD BY AUTOMATED COUNT: 18.6 % (ref 11.5–17)
FLUAV AG UPPER RESP QL IA.RAPID: NEGATIVE
FLUBV AG UPPER RESP QL IA.RAPID: NEGATIVE
GLOBULIN SER-MCNC: 4.1 GM/DL (ref 2.4–3.5)
GLUCOSE (UA): ABNORMAL
GLUCOSE SERPL-MCNC: 95 MG/DL (ref 74–106)
HCO3 UR-SCNC: 25 MMOL/L (ref 22–26)
HCT VFR BLD AUTO: 43.7 % (ref 37–47)
HGB BLD-MCNC: 13.4 GM/DL (ref 12–16)
HGB UR QL STRIP: NEGATIVE
KETONES UR QL STRIP: NEGATIVE
LEUKOCYTE ESTERASE UR QL STRIP: NEGATIVE
LYMPHOCYTES # BLD AUTO: 1.1 X10(3)/MCL (ref 0.6–4.6)
LYMPHOCYTES NFR BLD AUTO: 8 %
MCH RBC QN AUTO: 25.9 PG (ref 27–31)
MCHC RBC AUTO-ENTMCNC: 30.7 GM/DL (ref 33–36)
MCV RBC AUTO: 84.4 FL (ref 80–94)
MONOCYTES # BLD AUTO: 1 X10(3)/MCL (ref 0.1–1.3)
MONOCYTES NFR BLD AUTO: 7 %
NEUTROPHILS # BLD AUTO: 11.75 X10(3)/MCL (ref 2.1–9.2)
NEUTROPHILS NFR BLD AUTO: 82 %
NITRITE UR QL STRIP: NEGATIVE
PCO2 BLDA: 32 MMHG (ref 35–45)
PH SMN: 7.5 [PH] (ref 7.35–7.45)
PH UR STRIP: 7 [PH] (ref 5–9)
PLATELET # BLD AUTO: 265 X10(3)/MCL (ref 130–400)
PMV BLD AUTO: 9.4 FL (ref 9.4–12.4)
PO2 BLDA: 41 MMHG (ref 80–100)
POC ALLENS TEST: ABNORMAL
POC BE: 2.3 (ref -2–3)
POC CO2: 26 MMOL/L (ref 22–27)
POC IONIZED CALCIUM: 1.01 MMOL/L (ref 1.12–1.23)
POC SAMPLESOURCE: ABNORMAL
POC SATURATED O2: 81 % (ref 96–97)
POC SITE: ABNORMAL
POC TREATMENT: ABNORMAL
POC TROPONIN: 0.01 NG/ML (ref 0–0.08)
POTASSIUM BLD-SCNC: 4.2 MMOL/L (ref 3.6–5)
POTASSIUM SERPL-SCNC: 4.2 MMOL/L (ref 3.5–5.1)
PROT SERPL-MCNC: 7.2 GM/DL (ref 6.4–8.2)
PROT UR QL STRIP: ABNORMAL
RBC # BLD AUTO: 5.18 X10(6)/MCL (ref 4.2–5.4)
RBC #/AREA URNS HPF: ABNORMAL /[HPF]
SETTING 1 ABG: ABNORMAL
SODIUM BLD-SCNC: 138 MMOL/L (ref 137–145)
SODIUM SERPL-SCNC: 140 MMOL/L (ref 136–145)
SP GR UR STRIP: 1.03 (ref 1–1.03)
SQUAMOUS EPITHELIAL, UA: ABNORMAL
TROPONIN I SERPL-MCNC: <0.02 NG/ML (ref 0.02–0.49)
UROBILINOGEN UR STRIP-ACNC: 0.2
WBC # SPEC AUTO: 14.4 X10(3)/MCL (ref 4.5–11.5)
WBC #/AREA URNS HPF: ABNORMAL /[HPF]

## 2019-03-11 LAB
ABS NEUT (OLG): 8.19 X10(3)/MCL (ref 2.1–9.2)
BASOPHILS # BLD AUTO: 0 X10(3)/MCL (ref 0–0.2)
BASOPHILS NFR BLD AUTO: 0 %
BUN SERPL-MCNC: 15 MG/DL (ref 7–18)
CALCIUM SERPL-MCNC: 8.6 MG/DL (ref 8.5–10.1)
CHLORIDE SERPL-SCNC: 108 MMOL/L (ref 98–107)
CO2 SERPL-SCNC: 23 MMOL/L (ref 21–32)
CREAT SERPL-MCNC: 0.78 MG/DL (ref 0.55–1.02)
CREAT/UREA NIT SERPL: 19.2
ERYTHROCYTE [DISTWIDTH] IN BLOOD BY AUTOMATED COUNT: 18.4 % (ref 11.5–17)
GLUCOSE SERPL-MCNC: 121 MG/DL (ref 74–106)
HCT VFR BLD AUTO: 43.4 % (ref 37–47)
HGB BLD-MCNC: 13.7 GM/DL (ref 12–16)
LYMPHOCYTES # BLD AUTO: 0.5 X10(3)/MCL (ref 0.6–4.6)
LYMPHOCYTES NFR BLD AUTO: 6 %
MCH RBC QN AUTO: 25.7 PG (ref 27–31)
MCHC RBC AUTO-ENTMCNC: 31.6 GM/DL (ref 33–36)
MCV RBC AUTO: 81.3 FL (ref 80–94)
MONOCYTES # BLD AUTO: 0.1 X10(3)/MCL (ref 0.1–1.3)
MONOCYTES NFR BLD AUTO: 1 %
NEUTROPHILS # BLD AUTO: 8.19 X10(3)/MCL (ref 2.1–9.2)
NEUTROPHILS NFR BLD AUTO: 92 %
PLATELET # BLD AUTO: 250 X10(3)/MCL (ref 130–400)
PMV BLD AUTO: 9.7 FL (ref 9.4–12.4)
POTASSIUM SERPL-SCNC: 4.6 MMOL/L (ref 3.5–5.1)
RBC # BLD AUTO: 5.34 X10(6)/MCL (ref 4.2–5.4)
SODIUM SERPL-SCNC: 141 MMOL/L (ref 136–145)
WBC # SPEC AUTO: 8.9 X10(3)/MCL (ref 4.5–11.5)

## 2019-03-17 ENCOUNTER — HISTORICAL (OUTPATIENT)
Dept: ADMINISTRATIVE | Facility: HOSPITAL | Age: 84
End: 2019-03-17

## 2019-03-17 LAB
ABS NEUT (OLG): 22.84 X10(3)/MCL (ref 2.1–9.2)
ALBUMIN SERPL-MCNC: 3.1 GM/DL (ref 3.4–5)
ALBUMIN/GLOB SERPL: 0.8 RATIO (ref 1.1–2)
ALP SERPL-CCNC: 62 UNIT/L (ref 38–126)
ALT SERPL-CCNC: 15 UNIT/L (ref 12–78)
APTT PPP: 31.1 SECOND(S) (ref 24.2–33.9)
AST SERPL-CCNC: 13 UNIT/L (ref 15–37)
BASOPHILS # BLD AUTO: 0 X10(3)/MCL (ref 0–0.2)
BASOPHILS NFR BLD AUTO: 0 %
BILIRUB SERPL-MCNC: 0.6 MG/DL (ref 0.2–1)
BILIRUBIN DIRECT+TOT PNL SERPL-MCNC: 0.2 MG/DL (ref 0–0.5)
BILIRUBIN DIRECT+TOT PNL SERPL-MCNC: 0.4 MG/DL (ref 0–0.8)
BNP BLD-MCNC: 399 PG/ML (ref 0–100)
BUN SERPL-MCNC: 22 MG/DL (ref 7–18)
CALCIUM SERPL-MCNC: 8.3 MG/DL (ref 8.5–10.1)
CHLORIDE SERPL-SCNC: 103 MMOL/L (ref 98–107)
CO2 SERPL-SCNC: 26 MMOL/L (ref 21–32)
CREAT SERPL-MCNC: 0.99 MG/DL (ref 0.55–1.02)
EOSINOPHIL # BLD AUTO: 0 X10(3)/MCL (ref 0–0.9)
ERYTHROCYTE [DISTWIDTH] IN BLOOD BY AUTOMATED COUNT: 18.3 % (ref 11.5–17)
FLUAV AG UPPER RESP QL IA.RAPID: NEGATIVE
FLUBV AG UPPER RESP QL IA.RAPID: NEGATIVE
GLOBULIN SER-MCNC: 3.8 GM/DL (ref 2.4–3.5)
GLUCOSE SERPL-MCNC: 120 MG/DL (ref 74–106)
HCO3 UR-SCNC: 25.1 MMOL/L (ref 22–26)
HCT VFR BLD AUTO: 46 % (ref 37–47)
HGB BLD-MCNC: 14.2 GM/DL (ref 12–16)
INR PPP: 1.2 (ref 0–1.3)
LACTATE SERPL-SCNC: 1.5 MMOL/L (ref 0.4–2)
LYMPHOCYTES # BLD AUTO: 1.5 X10(3)/MCL (ref 0.6–4.6)
LYMPHOCYTES NFR BLD AUTO: 6 %
MCH RBC QN AUTO: 25.4 PG (ref 27–31)
MCHC RBC AUTO-ENTMCNC: 30.9 GM/DL (ref 33–36)
MCV RBC AUTO: 82.4 FL (ref 80–94)
MONOCYTES # BLD AUTO: 2.2 X10(3)/MCL (ref 0.1–1.3)
MONOCYTES NFR BLD AUTO: 8 %
NEUTROPHILS # BLD AUTO: 22.84 X10(3)/MCL (ref 2.1–9.2)
NEUTROPHILS NFR BLD AUTO: 85 %
PCO2 BLDA: 33 MMHG (ref 35–45)
PH SMN: 7.49 [PH] (ref 7.35–7.45)
PLATELET # BLD AUTO: 291 X10(3)/MCL (ref 130–400)
PMV BLD AUTO: 9.5 FL (ref 9.4–12.4)
PO2 BLDA: 58 MMHG (ref 80–100)
POC ALLENS TEST: ABNORMAL
POC BE: 2.1 (ref -2–3)
POC CO2: 26.1 MMOL/L (ref 22–27)
POC IONIZED CALCIUM: 1.01 MMOL/L (ref 1.12–1.23)
POC SAMPLESOURCE: ABNORMAL
POC SATURATED O2: 92 % (ref 96–97)
POC SITE: ABNORMAL
POC TREATMENT: ABNORMAL
POC TROPONIN: 0.02 NG/ML (ref 0–0.08)
POTASSIUM BLD-SCNC: 3.7 MMOL/L (ref 3.6–5)
POTASSIUM SERPL-SCNC: 3.8 MMOL/L (ref 3.5–5.1)
PROT SERPL-MCNC: 6.9 GM/DL (ref 6.4–8.2)
PROTHROMBIN TIME: 15 SECOND(S) (ref 12–14)
RBC # BLD AUTO: 5.58 X10(6)/MCL (ref 4.2–5.4)
SODIUM BLD-SCNC: 137 MMOL/L (ref 137–145)
SODIUM SERPL-SCNC: 139 MMOL/L (ref 136–145)
STREP A PCR (OHS): NOT DETECTED
TROPONIN I SERPL-MCNC: <0.02 NG/ML (ref 0.02–0.49)
WBC # SPEC AUTO: 26.8 X10(3)/MCL (ref 4.5–11.5)

## 2019-03-18 LAB
ABS NEUT (OLG): 22.77 X10(3)/MCL (ref 2.1–9.2)
ALBUMIN SERPL-MCNC: 2.6 GM/DL (ref 3.4–5)
ALBUMIN/GLOB SERPL: 0.6 {RATIO}
ALP SERPL-CCNC: 51 UNIT/L (ref 38–126)
ALT SERPL-CCNC: 11 UNIT/L (ref 12–78)
AST SERPL-CCNC: 14 UNIT/L (ref 15–37)
BASOPHILS # BLD AUTO: 0 X10(3)/MCL (ref 0–0.2)
BASOPHILS NFR BLD AUTO: 0 %
BILIRUB SERPL-MCNC: 0.5 MG/DL (ref 0.2–1)
BILIRUBIN DIRECT+TOT PNL SERPL-MCNC: 0.2 MG/DL (ref 0–0.2)
BILIRUBIN DIRECT+TOT PNL SERPL-MCNC: 0.3 MG/DL (ref 0–0.8)
BUN SERPL-MCNC: 15 MG/DL (ref 7–18)
CALCIUM SERPL-MCNC: 8.1 MG/DL (ref 8.5–10.1)
CHLORIDE SERPL-SCNC: 105 MMOL/L (ref 98–107)
CO2 SERPL-SCNC: 23 MMOL/L (ref 21–32)
CREAT SERPL-MCNC: 0.7 MG/DL (ref 0.55–1.02)
ERYTHROCYTE [DISTWIDTH] IN BLOOD BY AUTOMATED COUNT: 18 % (ref 11.5–17)
GLOBULIN SER-MCNC: 4.4 GM/DL (ref 2.4–3.5)
GLUCOSE SERPL-MCNC: 113 MG/DL (ref 74–106)
HCT VFR BLD AUTO: 42.5 % (ref 37–47)
HGB BLD-MCNC: 13.1 GM/DL (ref 12–16)
LYMPHOCYTES # BLD AUTO: 1.1 X10(3)/MCL (ref 0.6–4.6)
LYMPHOCYTES NFR BLD AUTO: 4 %
MCH RBC QN AUTO: 25.4 PG (ref 27–31)
MCHC RBC AUTO-ENTMCNC: 30.8 GM/DL (ref 33–36)
MCV RBC AUTO: 82.4 FL (ref 80–94)
MONOCYTES # BLD AUTO: 1.3 X10(3)/MCL (ref 0.1–1.3)
MONOCYTES NFR BLD AUTO: 5 %
NEUTROPHILS # BLD AUTO: 22.77 X10(3)/MCL (ref 2.1–9.2)
NEUTROPHILS NFR BLD AUTO: 89 %
PLATELET # BLD AUTO: 245 X10(3)/MCL (ref 130–400)
PMV BLD AUTO: 10.1 FL (ref 9.4–12.4)
POTASSIUM SERPL-SCNC: 3.7 MMOL/L (ref 3.5–5.1)
PROT SERPL-MCNC: 7 GM/DL (ref 6.4–8.2)
RBC # BLD AUTO: 5.16 X10(6)/MCL (ref 4.2–5.4)
SODIUM SERPL-SCNC: 139 MMOL/L (ref 136–145)
WBC # SPEC AUTO: 25.6 X10(3)/MCL (ref 4.5–11.5)

## 2019-03-19 LAB
ABS NEUT (OLG): 16.85 X10(3)/MCL (ref 2.1–9.2)
BASOPHILS # BLD AUTO: 0 X10(3)/MCL (ref 0–0.2)
BASOPHILS NFR BLD AUTO: 0 %
BUN SERPL-MCNC: 20 MG/DL (ref 7–18)
CALCIUM SERPL-MCNC: 7.8 MG/DL (ref 8.5–10.1)
CHLORIDE SERPL-SCNC: 108 MMOL/L (ref 98–107)
CO2 SERPL-SCNC: 24 MMOL/L (ref 21–32)
CREAT SERPL-MCNC: 0.59 MG/DL (ref 0.55–1.02)
CREAT/UREA NIT SERPL: 33.9
ERYTHROCYTE [DISTWIDTH] IN BLOOD BY AUTOMATED COUNT: 17.8 % (ref 11.5–17)
GLUCOSE SERPL-MCNC: 126 MG/DL (ref 74–106)
HCT VFR BLD AUTO: 39.6 % (ref 37–47)
HGB BLD-MCNC: 12.4 GM/DL (ref 12–16)
LYMPHOCYTES # BLD AUTO: 0.6 X10(3)/MCL (ref 0.6–4.6)
LYMPHOCYTES NFR BLD AUTO: 3 %
MCH RBC QN AUTO: 25.5 PG (ref 27–31)
MCHC RBC AUTO-ENTMCNC: 31.3 GM/DL (ref 33–36)
MCV RBC AUTO: 81.3 FL (ref 80–94)
MONOCYTES # BLD AUTO: 0.6 X10(3)/MCL (ref 0.1–1.3)
MONOCYTES NFR BLD AUTO: 3 %
NEUTROPHILS # BLD AUTO: 16.85 X10(3)/MCL (ref 2.1–9.2)
NEUTROPHILS NFR BLD AUTO: 93 %
PLATELET # BLD AUTO: 239 X10(3)/MCL (ref 130–400)
PMV BLD AUTO: 9.2 FL (ref 9.4–12.4)
POTASSIUM SERPL-SCNC: 3.7 MMOL/L (ref 3.5–5.1)
RBC # BLD AUTO: 4.87 X10(6)/MCL (ref 4.2–5.4)
SODIUM SERPL-SCNC: 141 MMOL/L (ref 136–145)
WBC # SPEC AUTO: 18.2 X10(3)/MCL (ref 4.5–11.5)

## 2019-03-21 LAB
ABS NEUT (OLG): 8.8 X10(3)/MCL (ref 2.1–9.2)
BASOPHILS # BLD AUTO: 0 X10(3)/MCL (ref 0–0.2)
BASOPHILS NFR BLD AUTO: 0 %
BUN SERPL-MCNC: 28 MG/DL (ref 7–18)
CALCIUM SERPL-MCNC: 7.8 MG/DL (ref 8.5–10.1)
CHLORIDE SERPL-SCNC: 104 MMOL/L (ref 98–107)
CO2 SERPL-SCNC: 26 MMOL/L (ref 21–32)
CREAT SERPL-MCNC: 0.77 MG/DL (ref 0.55–1.02)
ERYTHROCYTE [DISTWIDTH] IN BLOOD BY AUTOMATED COUNT: 17.2 % (ref 11.5–17)
GLUCOSE SERPL-MCNC: 113 MG/DL (ref 74–106)
HCT VFR BLD AUTO: 38.5 % (ref 37–47)
HGB BLD-MCNC: 12.2 GM/DL (ref 12–16)
LYMPHOCYTES # BLD AUTO: 0.8 X10(3)/MCL (ref 0.6–4.6)
LYMPHOCYTES NFR BLD AUTO: 7 %
MCH RBC QN AUTO: 25.5 PG (ref 27–31)
MCHC RBC AUTO-ENTMCNC: 31.7 GM/DL (ref 33–36)
MCV RBC AUTO: 80.5 FL (ref 80–94)
MONOCYTES # BLD AUTO: 0.5 X10(3)/MCL (ref 0.1–1.3)
MONOCYTES NFR BLD AUTO: 5 %
NEUTROPHILS # BLD AUTO: 8.8 X10(3)/MCL (ref 2.1–9.2)
NEUTROPHILS NFR BLD AUTO: 87 %
PLATELET # BLD AUTO: 291 X10(3)/MCL (ref 130–400)
PMV BLD AUTO: 9.6 FL (ref 9.4–12.4)
POTASSIUM SERPL-SCNC: 5 MMOL/L (ref 3.5–5.1)
RBC # BLD AUTO: 4.78 X10(6)/MCL (ref 4.2–5.4)
SODIUM SERPL-SCNC: 137 MMOL/L (ref 136–145)
WBC # SPEC AUTO: 10.2 X10(3)/MCL (ref 4.5–11.5)

## 2019-03-22 LAB
FINAL CULTURE: NORMAL
FINAL CULTURE: NORMAL

## 2019-05-19 ENCOUNTER — HISTORICAL (OUTPATIENT)
Dept: ADMINISTRATIVE | Facility: HOSPITAL | Age: 84
End: 2019-05-19

## 2019-05-19 LAB
ABS NEUT (OLG): 5.55 X10(3)/MCL (ref 2.1–9.2)
ALBUMIN SERPL-MCNC: 3.6 GM/DL (ref 3.4–5)
ALBUMIN/GLOB SERPL: 1.1 RATIO (ref 1.1–2)
ALP SERPL-CCNC: 57 UNIT/L (ref 38–126)
ALT SERPL-CCNC: 16 UNIT/L (ref 12–78)
AST SERPL-CCNC: 17 UNIT/L (ref 15–37)
BASOPHILS # BLD AUTO: 0 X10(3)/MCL (ref 0–0.2)
BASOPHILS NFR BLD AUTO: 0 %
BILIRUB SERPL-MCNC: 0.4 MG/DL (ref 0.2–1)
BILIRUBIN DIRECT+TOT PNL SERPL-MCNC: 0.1 MG/DL (ref 0–0.5)
BILIRUBIN DIRECT+TOT PNL SERPL-MCNC: 0.3 MG/DL (ref 0–0.8)
BNP BLD-MCNC: 270 PG/ML (ref 0–100)
BUN SERPL-MCNC: 23 MG/DL (ref 7–18)
CALCIUM SERPL-MCNC: 8.6 MG/DL (ref 8.5–10.1)
CHLORIDE SERPL-SCNC: 105 MMOL/L (ref 98–107)
CO2 SERPL-SCNC: 28 MMOL/L (ref 21–32)
CREAT SERPL-MCNC: 1.23 MG/DL (ref 0.55–1.02)
EOSINOPHIL # BLD AUTO: 0.2 X10(3)/MCL (ref 0–0.9)
EOSINOPHIL NFR BLD AUTO: 2 %
ERYTHROCYTE [DISTWIDTH] IN BLOOD BY AUTOMATED COUNT: 18.8 % (ref 11.5–17)
GLOBULIN SER-MCNC: 3.2 GM/DL (ref 2.4–3.5)
GLUCOSE SERPL-MCNC: 97 MG/DL (ref 74–106)
HCT VFR BLD AUTO: 33.6 % (ref 37–47)
HGB BLD-MCNC: 9.5 GM/DL (ref 12–16)
LYMPHOCYTES # BLD AUTO: 5.2 X10(3)/MCL (ref 0.6–4.6)
LYMPHOCYTES NFR BLD AUTO: 43 %
MCH RBC QN AUTO: 21.9 PG (ref 27–31)
MCHC RBC AUTO-ENTMCNC: 28.3 GM/DL (ref 33–36)
MCV RBC AUTO: 77.6 FL (ref 80–94)
MONOCYTES # BLD AUTO: 1.1 X10(3)/MCL (ref 0.1–1.3)
MONOCYTES NFR BLD AUTO: 9 %
NEUTROPHILS # BLD AUTO: 5.55 X10(3)/MCL (ref 2.1–9.2)
NEUTROPHILS NFR BLD AUTO: 46 %
PLATELET # BLD AUTO: 305 X10(3)/MCL (ref 130–400)
PMV BLD AUTO: 9.7 FL (ref 9.4–12.4)
POC TROPONIN: 0.01 NG/ML (ref 0–0.08)
POTASSIUM SERPL-SCNC: 4.2 MMOL/L (ref 3.5–5.1)
PROT SERPL-MCNC: 6.8 GM/DL (ref 6.4–8.2)
RBC # BLD AUTO: 4.33 X10(6)/MCL (ref 4.2–5.4)
SODIUM SERPL-SCNC: 138 MMOL/L (ref 136–145)
WBC # SPEC AUTO: 12.2 X10(3)/MCL (ref 4.5–11.5)

## 2019-05-20 LAB
ABS NEUT (OLG): 4.34 X10(3)/MCL (ref 2.1–9.2)
BASOPHILS # BLD AUTO: 0 X10(3)/MCL (ref 0–0.2)
BASOPHILS NFR BLD AUTO: 0 %
BUN SERPL-MCNC: 26 MG/DL (ref 7–18)
CALCIUM SERPL-MCNC: 8.5 MG/DL (ref 8.5–10.1)
CHLORIDE SERPL-SCNC: 108 MMOL/L (ref 98–107)
CO2 SERPL-SCNC: 26 MMOL/L (ref 21–32)
CREAT SERPL-MCNC: 1.01 MG/DL (ref 0.55–1.02)
CREAT/UREA NIT SERPL: 25.7
EOSINOPHIL # BLD AUTO: 0 X10(3)/MCL (ref 0–0.9)
EOSINOPHIL NFR BLD AUTO: 0 %
ERYTHROCYTE [DISTWIDTH] IN BLOOD BY AUTOMATED COUNT: 18.8 % (ref 11.5–17)
GLUCOSE SERPL-MCNC: 116 MG/DL (ref 74–106)
HCT VFR BLD AUTO: 31.8 % (ref 37–47)
HGB BLD-MCNC: 9.1 GM/DL (ref 12–16)
LYMPHOCYTES # BLD AUTO: 1.2 X10(3)/MCL (ref 0.6–4.6)
LYMPHOCYTES NFR BLD AUTO: 20 %
MCH RBC QN AUTO: 21.6 PG (ref 27–31)
MCHC RBC AUTO-ENTMCNC: 28.6 GM/DL (ref 33–36)
MCV RBC AUTO: 75.4 FL (ref 80–94)
MONOCYTES # BLD AUTO: 0.1 X10(3)/MCL (ref 0.1–1.3)
MONOCYTES NFR BLD AUTO: 2 %
NEUTROPHILS # BLD AUTO: 4.34 X10(3)/MCL (ref 2.1–9.2)
NEUTROPHILS NFR BLD AUTO: 77 %
PLATELET # BLD AUTO: 330 X10(3)/MCL (ref 130–400)
PMV BLD AUTO: 10.1 FL (ref 9.4–12.4)
POTASSIUM SERPL-SCNC: 5.1 MMOL/L (ref 3.5–5.1)
RBC # BLD AUTO: 4.22 X10(6)/MCL (ref 4.2–5.4)
SODIUM SERPL-SCNC: 140 MMOL/L (ref 136–145)
WBC # SPEC AUTO: 5.6 X10(3)/MCL (ref 4.5–11.5)

## 2019-05-21 LAB
ABS NEUT (OLG): 9.01 X10(3)/MCL (ref 2.1–9.2)
ALBUMIN SERPL-MCNC: 3.4 GM/DL (ref 3.4–5)
ALBUMIN/GLOB SERPL: 1.1 RATIO (ref 1.1–2)
ALP SERPL-CCNC: 47 UNIT/L (ref 38–126)
ALT SERPL-CCNC: 16 UNIT/L (ref 12–78)
AST SERPL-CCNC: 34 UNIT/L (ref 15–37)
BASOPHILS # BLD AUTO: 0 X10(3)/MCL (ref 0–0.2)
BASOPHILS NFR BLD AUTO: 0 %
BILIRUB SERPL-MCNC: 0.4 MG/DL (ref 0.2–1)
BILIRUBIN DIRECT+TOT PNL SERPL-MCNC: 0.1 MG/DL (ref 0–0.5)
BILIRUBIN DIRECT+TOT PNL SERPL-MCNC: 0.3 MG/DL (ref 0–0.8)
BUN SERPL-MCNC: 27 MG/DL (ref 7–18)
CALCIUM SERPL-MCNC: 8.7 MG/DL (ref 8.5–10.1)
CHLORIDE SERPL-SCNC: 112 MMOL/L (ref 98–107)
CO2 SERPL-SCNC: 19 MMOL/L (ref 21–32)
CREAT SERPL-MCNC: 0.84 MG/DL (ref 0.55–1.02)
EOSINOPHIL # BLD AUTO: 0 X10(3)/MCL (ref 0–0.9)
EOSINOPHIL NFR BLD AUTO: 0 %
ERYTHROCYTE [DISTWIDTH] IN BLOOD BY AUTOMATED COUNT: 18.9 % (ref 11.5–17)
GLOBULIN SER-MCNC: 3.2 GM/DL (ref 2.4–3.5)
GLUCOSE SERPL-MCNC: 78 MG/DL (ref 74–106)
HCT VFR BLD AUTO: 31.9 % (ref 37–47)
HGB BLD-MCNC: 9.2 GM/DL (ref 12–16)
LYMPHOCYTES # BLD AUTO: 2.4 X10(3)/MCL (ref 0.6–4.6)
LYMPHOCYTES NFR BLD AUTO: 19 %
MAGNESIUM SERPL-MCNC: 2.9 MG/DL (ref 1.8–2.4)
MCH RBC QN AUTO: 21.6 PG (ref 27–31)
MCHC RBC AUTO-ENTMCNC: 28.8 GM/DL (ref 33–36)
MCV RBC AUTO: 75.1 FL (ref 80–94)
MONOCYTES # BLD AUTO: 0.8 X10(3)/MCL (ref 0.1–1.3)
MONOCYTES NFR BLD AUTO: 7 %
NEUTROPHILS # BLD AUTO: 9.01 X10(3)/MCL (ref 2.1–9.2)
NEUTROPHILS NFR BLD AUTO: 74 %
PLATELET # BLD AUTO: 300 X10(3)/MCL (ref 130–400)
PMV BLD AUTO: 10.3 FL (ref 9.4–12.4)
POTASSIUM SERPL-SCNC: 4.7 MMOL/L (ref 3.5–5.1)
PROT SERPL-MCNC: 6.6 GM/DL (ref 6.4–8.2)
RBC # BLD AUTO: 4.25 X10(6)/MCL (ref 4.2–5.4)
SODIUM SERPL-SCNC: 142 MMOL/L (ref 136–145)
WBC # SPEC AUTO: 12.3 X10(3)/MCL (ref 4.5–11.5)

## 2019-06-21 ENCOUNTER — HISTORICAL (OUTPATIENT)
Dept: ADMINISTRATIVE | Facility: HOSPITAL | Age: 84
End: 2019-06-21

## 2019-06-21 LAB
ABS NEUT (OLG): 5.77 X10(3)/MCL (ref 2.1–9.2)
ALBUMIN SERPL-MCNC: 3.6 GM/DL (ref 3.4–5)
ALBUMIN/GLOB SERPL: 1.2 RATIO (ref 1.1–2)
ALP SERPL-CCNC: 57 UNIT/L (ref 38–126)
ALT SERPL-CCNC: 14 UNIT/L (ref 12–78)
APPEARANCE, UA: CLEAR
AST SERPL-CCNC: 16 UNIT/L (ref 15–37)
BACTERIA SPEC CULT: NORMAL /HPF
BASOPHILS # BLD AUTO: 0 X10(3)/MCL (ref 0–0.2)
BASOPHILS NFR BLD AUTO: 0 %
BILIRUB SERPL-MCNC: 0.3 MG/DL (ref 0.2–1)
BILIRUB UR QL STRIP: NEGATIVE
BILIRUBIN DIRECT+TOT PNL SERPL-MCNC: 0.1 MG/DL (ref 0–0.5)
BILIRUBIN DIRECT+TOT PNL SERPL-MCNC: 0.2 MG/DL (ref 0–0.8)
BNP BLD-MCNC: 409 PG/ML (ref 0–100)
BUN SERPL-MCNC: 22 MG/DL (ref 7–18)
CALCIUM SERPL-MCNC: 9.1 MG/DL (ref 8.5–10.1)
CHLORIDE SERPL-SCNC: 109 MMOL/L (ref 98–107)
CO2 SERPL-SCNC: 24 MMOL/L (ref 21–32)
COLOR UR: YELLOW
CREAT SERPL-MCNC: 1.02 MG/DL (ref 0.55–1.02)
EOSINOPHIL # BLD AUTO: 0.1 X10(3)/MCL (ref 0–0.9)
EOSINOPHIL NFR BLD AUTO: 1 %
ERYTHROCYTE [DISTWIDTH] IN BLOOD BY AUTOMATED COUNT: 19.4 % (ref 11.5–17)
GLOBULIN SER-MCNC: 3.1 GM/DL (ref 2.4–3.5)
GLUCOSE (UA): NEGATIVE
GLUCOSE SERPL-MCNC: 88 MG/DL (ref 74–106)
HCT VFR BLD AUTO: 31.6 % (ref 37–47)
HGB BLD-MCNC: 8.8 GM/DL (ref 12–16)
HGB UR QL STRIP: NEGATIVE
KETONES UR QL STRIP: NEGATIVE
LEUKOCYTE ESTERASE UR QL STRIP: NEGATIVE
LYMPHOCYTES # BLD AUTO: 2.1 X10(3)/MCL (ref 0.6–4.6)
LYMPHOCYTES NFR BLD AUTO: 24 %
MCH RBC QN AUTO: 20.5 PG (ref 27–31)
MCHC RBC AUTO-ENTMCNC: 27.8 GM/DL (ref 33–36)
MCV RBC AUTO: 73.5 FL (ref 80–94)
MONOCYTES # BLD AUTO: 0.9 X10(3)/MCL (ref 0.1–1.3)
MONOCYTES NFR BLD AUTO: 10 %
NEUTROPHILS # BLD AUTO: 5.77 X10(3)/MCL (ref 2.1–9.2)
NEUTROPHILS NFR BLD AUTO: 64 %
NITRITE UR QL STRIP: NEGATIVE
PH UR STRIP: 5.5 [PH] (ref 5–9)
PLATELET # BLD AUTO: 316 X10(3)/MCL (ref 130–400)
PMV BLD AUTO: 9 FL (ref 9.4–12.4)
POTASSIUM SERPL-SCNC: 4.2 MMOL/L (ref 3.5–5.1)
PROT SERPL-MCNC: 6.7 GM/DL (ref 6.4–8.2)
PROT UR QL STRIP: NEGATIVE
RBC # BLD AUTO: 4.3 X10(6)/MCL (ref 4.2–5.4)
RBC #/AREA URNS HPF: NORMAL /[HPF]
SODIUM SERPL-SCNC: 140 MMOL/L (ref 136–145)
SP GR UR STRIP: 1.01 (ref 1–1.03)
SQUAMOUS EPITHELIAL, UA: NORMAL
TROPONIN I SERPL-MCNC: <0.02 NG/ML (ref 0.02–0.49)
UROBILINOGEN UR STRIP-ACNC: 0.2
WBC # SPEC AUTO: 8.9 X10(3)/MCL (ref 4.5–11.5)
WBC #/AREA URNS HPF: NORMAL /[HPF]

## 2019-06-28 ENCOUNTER — HISTORICAL (OUTPATIENT)
Dept: ADMINISTRATIVE | Facility: HOSPITAL | Age: 84
End: 2019-06-28

## 2019-07-06 ENCOUNTER — HISTORICAL (OUTPATIENT)
Dept: ADMINISTRATIVE | Facility: HOSPITAL | Age: 84
End: 2019-07-06

## 2019-07-06 LAB
ABS NEUT (OLG): 8.36 X10(3)/MCL (ref 2.1–9.2)
ALBUMIN SERPL-MCNC: 3.3 GM/DL (ref 3.4–5)
ALBUMIN/GLOB SERPL: 1 {RATIO}
ALP SERPL-CCNC: 62 UNIT/L (ref 38–126)
ALT SERPL-CCNC: 17 UNIT/L (ref 12–78)
APTT PPP: 36.5 SECOND(S) (ref 24.2–33.9)
AST SERPL-CCNC: 18 UNIT/L (ref 15–37)
BASOPHILS # BLD AUTO: 0 X10(3)/MCL (ref 0–0.2)
BASOPHILS NFR BLD AUTO: 0 %
BILIRUB SERPL-MCNC: 0.2 MG/DL (ref 0.2–1)
BILIRUBIN DIRECT+TOT PNL SERPL-MCNC: 0.1 MG/DL (ref 0–0.2)
BILIRUBIN DIRECT+TOT PNL SERPL-MCNC: 0.1 MG/DL (ref 0–0.8)
BNP BLD-MCNC: 210 PG/ML (ref 0–125)
BNP BLD-MCNC: 309 PG/ML (ref 0–100)
BUN SERPL-MCNC: 17 MG/DL (ref 7–18)
CALCIUM SERPL-MCNC: 8.1 MG/DL (ref 8.5–10.1)
CHLORIDE SERPL-SCNC: 109 MMOL/L (ref 98–107)
CO2 SERPL-SCNC: 24 MMOL/L (ref 21–32)
CREAT SERPL-MCNC: 1.01 MG/DL (ref 0.55–1.02)
EOSINOPHIL # BLD AUTO: 0.1 X10(3)/MCL (ref 0–0.9)
EOSINOPHIL NFR BLD AUTO: 1 %
ERYTHROCYTE [DISTWIDTH] IN BLOOD BY AUTOMATED COUNT: 20.6 % (ref 11.5–17)
GLOBULIN SER-MCNC: 3.4 GM/DL (ref 2.4–3.5)
GLUCOSE SERPL-MCNC: 101 MG/DL (ref 74–106)
HCT VFR BLD AUTO: 32.7 % (ref 37–47)
HGB BLD-MCNC: 9.2 GM/DL (ref 12–16)
INR PPP: 1.3 (ref 0–1.3)
LYMPHOCYTES # BLD AUTO: 1.9 X10(3)/MCL (ref 0.6–4.6)
LYMPHOCYTES NFR BLD AUTO: 17 %
MAGNESIUM SERPL-MCNC: 2.4 MG/DL (ref 1.8–2.4)
MCH RBC QN AUTO: 20.2 PG (ref 27–31)
MCHC RBC AUTO-ENTMCNC: 28.1 GM/DL (ref 33–36)
MCV RBC AUTO: 71.7 FL (ref 80–94)
MONOCYTES # BLD AUTO: 0.7 X10(3)/MCL (ref 0.1–1.3)
MONOCYTES NFR BLD AUTO: 7 %
NEUTROPHILS # BLD AUTO: 8.36 X10(3)/MCL (ref 2.1–9.2)
NEUTROPHILS NFR BLD AUTO: 75 %
PLATELET # BLD AUTO: 292 X10(3)/MCL (ref 130–400)
PMV BLD AUTO: 9.3 FL (ref 9.4–12.4)
POC TROPONIN: 0.01 NG/ML (ref 0–0.08)
POTASSIUM SERPL-SCNC: 4.2 MMOL/L (ref 3.5–5.1)
PROT SERPL-MCNC: 6.7 GM/DL (ref 6.4–8.2)
PROTHROMBIN TIME: 16.8 SECOND(S) (ref 12–14)
RBC # BLD AUTO: 4.56 X10(6)/MCL (ref 4.2–5.4)
SODIUM SERPL-SCNC: 141 MMOL/L (ref 136–145)
TROPONIN I SERPL-MCNC: <0.02 NG/ML (ref 0.02–0.49)
WBC # SPEC AUTO: 11.1 X10(3)/MCL (ref 4.5–11.5)

## 2019-08-12 ENCOUNTER — HISTORICAL (OUTPATIENT)
Dept: ADMINISTRATIVE | Facility: HOSPITAL | Age: 84
End: 2019-08-12

## 2019-08-12 LAB
ABS NEUT (OLG): 5.78 X10(3)/MCL (ref 2.1–9.2)
ALBUMIN SERPL-MCNC: 3.5 GM/DL (ref 3.4–5)
ALBUMIN/GLOB SERPL: 1.1 {RATIO}
ALP SERPL-CCNC: 62 UNIT/L (ref 38–126)
ALT SERPL-CCNC: 12 UNIT/L (ref 12–78)
APTT PPP: 38.8 SECOND(S) (ref 24.2–33.9)
AST SERPL-CCNC: 13 UNIT/L (ref 15–37)
BASOPHILS # BLD AUTO: 0 X10(3)/MCL (ref 0–0.2)
BASOPHILS NFR BLD AUTO: 0 %
BILIRUB SERPL-MCNC: 0.4 MG/DL (ref 0.2–1)
BILIRUBIN DIRECT+TOT PNL SERPL-MCNC: 0.1 MG/DL (ref 0–0.2)
BILIRUBIN DIRECT+TOT PNL SERPL-MCNC: 0.3 MG/DL (ref 0–0.8)
BUN SERPL-MCNC: 34 MG/DL (ref 7–18)
CALCIUM SERPL-MCNC: 8.7 MG/DL (ref 8.5–10.1)
CHLORIDE SERPL-SCNC: 108 MMOL/L (ref 98–107)
CO2 SERPL-SCNC: 26 MMOL/L (ref 21–32)
CREAT SERPL-MCNC: 1.17 MG/DL (ref 0.55–1.02)
EOSINOPHIL # BLD AUTO: 0.1 X10(3)/MCL (ref 0–0.9)
EOSINOPHIL NFR BLD AUTO: 1 %
ERYTHROCYTE [DISTWIDTH] IN BLOOD BY AUTOMATED COUNT: 22.3 % (ref 11.5–17)
GLOBULIN SER-MCNC: 3.1 GM/DL (ref 2.4–3.5)
GLUCOSE SERPL-MCNC: 86 MG/DL (ref 74–106)
HCT VFR BLD AUTO: 36.1 % (ref 37–47)
HGB BLD-MCNC: 9.9 GM/DL (ref 12–16)
INR PPP: 1.5 (ref 0–1.3)
LYMPHOCYTES # BLD AUTO: 1.7 X10(3)/MCL (ref 0.6–4.6)
LYMPHOCYTES NFR BLD AUTO: 19 %
MCH RBC QN AUTO: 19.2 PG (ref 27–31)
MCHC RBC AUTO-ENTMCNC: 27.4 GM/DL (ref 33–36)
MCV RBC AUTO: 70.1 FL (ref 80–94)
MONOCYTES # BLD AUTO: 1 X10(3)/MCL (ref 0.1–1.3)
MONOCYTES NFR BLD AUTO: 11 %
NEUTROPHILS # BLD AUTO: 5.78 X10(3)/MCL (ref 2.1–9.2)
NEUTROPHILS NFR BLD AUTO: 67 %
PLATELET # BLD AUTO: 316 X10(3)/MCL (ref 130–400)
PMV BLD AUTO: 9.1 FL (ref 9.4–12.4)
POTASSIUM SERPL-SCNC: 4 MMOL/L (ref 3.5–5.1)
PROT SERPL-MCNC: 6.6 GM/DL (ref 6.4–8.2)
PROTHROMBIN TIME: 18.2 SECOND(S) (ref 12–14)
RBC # BLD AUTO: 5.15 X10(6)/MCL (ref 4.2–5.4)
SODIUM SERPL-SCNC: 141 MMOL/L (ref 136–145)
TROPONIN I SERPL-MCNC: <0.02 NG/ML (ref 0.02–0.49)
WBC # SPEC AUTO: 8.6 X10(3)/MCL (ref 4.5–11.5)

## 2019-08-13 LAB
BUN SERPL-MCNC: 18 MG/DL (ref 7–18)
CALCIUM SERPL-MCNC: 8.8 MG/DL (ref 8.5–10.1)
CHLORIDE SERPL-SCNC: 112 MMOL/L (ref 98–107)
CHOLEST SERPL-MCNC: 136 MG/DL (ref 0–200)
CHOLEST/HDLC SERPL: 3 {RATIO} (ref 0–4)
CO2 SERPL-SCNC: 23 MMOL/L (ref 21–32)
CREAT SERPL-MCNC: 0.83 MG/DL (ref 0.55–1.02)
CREAT/UREA NIT SERPL: 21.7
GLUCOSE SERPL-MCNC: 86 MG/DL (ref 74–106)
HDLC SERPL-MCNC: 45 MG/DL (ref 35–60)
LDLC SERPL CALC-MCNC: 70 MG/DL (ref 0–129)
POTASSIUM SERPL-SCNC: 4.3 MMOL/L (ref 3.5–5.1)
SODIUM SERPL-SCNC: 145 MMOL/L (ref 136–145)
TRIGL SERPL-MCNC: 107 MG/DL (ref 30–150)
VLDLC SERPL CALC-MCNC: 21 MG/DL

## 2019-11-10 ENCOUNTER — HISTORICAL (OUTPATIENT)
Dept: ADMINISTRATIVE | Facility: HOSPITAL | Age: 84
End: 2019-11-10

## 2019-11-10 LAB
ABS NEUT (OLG): 7.38 X10(3)/MCL (ref 2.1–9.2)
ALBUMIN SERPL-MCNC: 3.3 GM/DL (ref 3.4–5)
ALBUMIN/GLOB SERPL: 1.2 {RATIO}
ALP SERPL-CCNC: 55 UNIT/L (ref 38–126)
ALT SERPL-CCNC: 21 UNIT/L (ref 12–78)
APTT PPP: 44.8 SECOND(S) (ref 24.2–33.9)
AST SERPL-CCNC: 19 UNIT/L (ref 15–37)
BASOPHILS # BLD AUTO: 0 X10(3)/MCL (ref 0–0.2)
BASOPHILS NFR BLD AUTO: 0 %
BILIRUB SERPL-MCNC: 0.7 MG/DL (ref 0.2–1)
BILIRUBIN DIRECT+TOT PNL SERPL-MCNC: 0.3 MG/DL (ref 0–0.2)
BILIRUBIN DIRECT+TOT PNL SERPL-MCNC: 0.4 MG/DL (ref 0–0.8)
BNP BLD-MCNC: 3081 PG/ML (ref 0–100)
BUN SERPL-MCNC: 24 MG/DL (ref 7–18)
CALCIUM SERPL-MCNC: 8.3 MG/DL (ref 8.5–10.1)
CHLORIDE SERPL-SCNC: 111 MMOL/L (ref 98–107)
CO2 SERPL-SCNC: 27 MMOL/L (ref 21–32)
CREAT SERPL-MCNC: 1.09 MG/DL (ref 0.55–1.02)
EOSINOPHIL # BLD AUTO: 0.1 X10(3)/MCL (ref 0–0.9)
EOSINOPHIL NFR BLD AUTO: 1 %
ERYTHROCYTE [DISTWIDTH] IN BLOOD BY AUTOMATED COUNT: 21.2 % (ref 11.5–17)
GLOBULIN SER-MCNC: 2.8 GM/DL (ref 2.4–3.5)
GLUCOSE SERPL-MCNC: 100 MG/DL (ref 74–106)
HCT VFR BLD AUTO: 33.7 % (ref 37–47)
HGB BLD-MCNC: 9.4 GM/DL (ref 12–16)
INR PPP: 1.8 (ref 0–1.3)
LYMPHOCYTES # BLD AUTO: 1.4 X10(3)/MCL (ref 0.6–4.6)
LYMPHOCYTES NFR BLD AUTO: 14 %
MCH RBC QN AUTO: 18.7 PG (ref 27–31)
MCHC RBC AUTO-ENTMCNC: 27.9 GM/DL (ref 33–36)
MCV RBC AUTO: 67 FL (ref 80–94)
MONOCYTES # BLD AUTO: 1.4 X10(3)/MCL (ref 0.1–1.3)
MONOCYTES NFR BLD AUTO: 13 %
NEUTROPHILS # BLD AUTO: 7.38 X10(3)/MCL (ref 2.1–9.2)
NEUTROPHILS NFR BLD AUTO: 71 %
PLATELET # BLD AUTO: 293 X10(3)/MCL (ref 130–400)
PMV BLD AUTO: 9.3 FL (ref 9.4–12.4)
POTASSIUM SERPL-SCNC: 3.8 MMOL/L (ref 3.5–5.1)
PROT SERPL-MCNC: 6.1 GM/DL (ref 6.4–8.2)
PROTHROMBIN TIME: 21 SECOND(S) (ref 12–14)
RBC # BLD AUTO: 5.03 X10(6)/MCL (ref 4.2–5.4)
SODIUM SERPL-SCNC: 144 MMOL/L (ref 136–145)
TROPONIN I SERPL-MCNC: 0.06 NG/ML (ref 0.02–0.49)
WBC # SPEC AUTO: 10.4 X10(3)/MCL (ref 4.5–11.5)

## 2019-11-11 LAB
BUN SERPL-MCNC: 26 MG/DL (ref 7–18)
CALCIUM SERPL-MCNC: 8.6 MG/DL (ref 8.5–10.1)
CHLORIDE SERPL-SCNC: 103 MMOL/L (ref 98–107)
CK MB SERPL-MCNC: 2.4 NG/ML (ref 0.5–3.6)
CK SERPL-CCNC: 63 UNIT/L (ref 26–192)
CO2 SERPL-SCNC: 32 MMOL/L (ref 21–32)
CREAT SERPL-MCNC: 1.04 MG/DL (ref 0.55–1.02)
CREAT/UREA NIT SERPL: 25
GLUCOSE SERPL-MCNC: 103 MG/DL (ref 74–106)
POTASSIUM SERPL-SCNC: 3.6 MMOL/L (ref 3.5–5.1)
SODIUM SERPL-SCNC: 142 MMOL/L (ref 136–145)
TROPONIN I SERPL-MCNC: 0.12 NG/ML (ref 0.02–0.49)

## 2019-11-21 ENCOUNTER — HISTORICAL (OUTPATIENT)
Dept: ADMINISTRATIVE | Facility: HOSPITAL | Age: 84
End: 2019-11-21

## 2019-11-21 LAB
ABS NEUT (OLG): 8.32 X10(3)/MCL (ref 2.1–9.2)
ALBUMIN SERPL-MCNC: 3.5 GM/DL (ref 3.4–5)
ALBUMIN/GLOB SERPL: 1.1 RATIO (ref 1.1–2)
ALP SERPL-CCNC: 67 UNIT/L (ref 38–126)
ALT SERPL-CCNC: 18 UNIT/L (ref 12–78)
ANISOCYTOSIS BLD QL SMEAR: 1
AST SERPL-CCNC: 15 UNIT/L (ref 15–37)
BASOPHILS # BLD AUTO: 0.1 X10(3)/MCL (ref 0–0.2)
BASOPHILS NFR BLD AUTO: 0 %
BILIRUB SERPL-MCNC: 0.5 MG/DL (ref 0.2–1)
BILIRUBIN DIRECT+TOT PNL SERPL-MCNC: 0.1 MG/DL (ref 0–0.5)
BILIRUBIN DIRECT+TOT PNL SERPL-MCNC: 0.4 MG/DL (ref 0–0.8)
BUN SERPL-MCNC: 39 MG/DL (ref 7–18)
CALCIUM SERPL-MCNC: 8.3 MG/DL (ref 8.5–10.1)
CHLORIDE SERPL-SCNC: 106 MMOL/L (ref 98–107)
CHOLEST SERPL-MCNC: 147 MG/DL (ref 0–200)
CHOLEST/HDLC SERPL: 3.6 {RATIO} (ref 0–4)
CO2 SERPL-SCNC: 27 MMOL/L (ref 21–32)
CREAT SERPL-MCNC: 1.33 MG/DL (ref 0.55–1.02)
EOSINOPHIL # BLD AUTO: 0.2 X10(3)/MCL (ref 0–0.9)
EOSINOPHIL NFR BLD AUTO: 2 %
ERYTHROCYTE [DISTWIDTH] IN BLOOD BY AUTOMATED COUNT: 22.3 % (ref 11.5–17)
GLOBULIN SER-MCNC: 3.3 GM/DL (ref 2.4–3.5)
GLUCOSE SERPL-MCNC: 105 MG/DL (ref 74–106)
HCT VFR BLD AUTO: 37.9 % (ref 37–47)
HDLC SERPL-MCNC: 41 MG/DL (ref 35–60)
HGB BLD-MCNC: 10.1 GM/DL (ref 12–16)
HYPOCHROMIA BLD QL SMEAR: 1
LDLC SERPL CALC-MCNC: 79 MG/DL (ref 0–129)
LYMPHOCYTES # BLD AUTO: 1.6 X10(3)/MCL (ref 0.6–4.6)
LYMPHOCYTES NFR BLD AUTO: 14 %
MACROCYTES BLD QL SMEAR: 1 /MCL
MCH RBC QN AUTO: 18.5 PG (ref 27–31)
MCHC RBC AUTO-ENTMCNC: 26.6 GM/DL (ref 33–36)
MCV RBC AUTO: 69.5 FL (ref 80–94)
MONOCYTES # BLD AUTO: 0.7 X10(3)/MCL (ref 0.1–1.3)
MONOCYTES NFR BLD AUTO: 7 %
NEUTROPHILS # BLD AUTO: 8.32 X10(3)/MCL (ref 2.1–9.2)
NEUTROPHILS NFR BLD AUTO: 76 %
PLATELET # BLD AUTO: 362 X10(3)/MCL (ref 130–400)
PLATELET # BLD EST: NORMAL 10*3/UL
PMV BLD AUTO: 9.8 FL (ref 7.4–10.4)
POTASSIUM SERPL-SCNC: 4.4 MMOL/L (ref 3.5–5.1)
PROT SERPL-MCNC: 6.8 GM/DL (ref 6.4–8.2)
RBC # BLD AUTO: 5.45 X10(6)/MCL (ref 4.2–5.4)
SODIUM SERPL-SCNC: 141 MMOL/L (ref 136–145)
TRIGL SERPL-MCNC: 137 MG/DL (ref 30–150)
TSH SERPL-ACNC: 2.31 MIU/L (ref 0.36–3.74)
VLDLC SERPL CALC-MCNC: 27 MG/DL
WBC # SPEC AUTO: 10.9 X10(3)/MCL (ref 4.5–11.5)

## 2019-12-13 ENCOUNTER — HISTORICAL (OUTPATIENT)
Dept: ADMINISTRATIVE | Facility: HOSPITAL | Age: 84
End: 2019-12-13

## 2019-12-13 LAB
ABS NEUT (OLG): 18.83 X10(3)/MCL (ref 2.1–9.2)
ALBUMIN SERPL-MCNC: 3.2 GM/DL (ref 3.4–5)
ALBUMIN/GLOB SERPL: 1 RATIO (ref 1.1–2)
ALP SERPL-CCNC: 79 UNIT/L (ref 38–126)
ALT SERPL-CCNC: 18 UNIT/L (ref 12–78)
APTT PPP: 51.3 SECOND(S) (ref 24.2–33.9)
AST SERPL-CCNC: 16 UNIT/L (ref 15–37)
BASOPHILS # BLD AUTO: 0 X10(3)/MCL (ref 0–0.2)
BASOPHILS NFR BLD AUTO: 0 %
BILIRUB SERPL-MCNC: 0.7 MG/DL (ref 0.2–1)
BILIRUBIN DIRECT+TOT PNL SERPL-MCNC: 0.3 MG/DL (ref 0–0.5)
BILIRUBIN DIRECT+TOT PNL SERPL-MCNC: 0.4 MG/DL (ref 0–0.8)
BNP BLD-MCNC: 1836 PG/ML (ref 0–125)
BNP BLD-MCNC: 3046 PG/ML (ref 0–100)
BUN SERPL-MCNC: 38 MG/DL (ref 7–18)
CALCIUM SERPL-MCNC: 8.6 MG/DL (ref 8.5–10.1)
CHLORIDE SERPL-SCNC: 106 MMOL/L (ref 98–107)
CK MB SERPL-MCNC: 1.6 NG/ML (ref 0.5–3.6)
CK SERPL-CCNC: 45 UNIT/L (ref 26–192)
CO2 SERPL-SCNC: 24 MMOL/L (ref 21–32)
CREAT SERPL-MCNC: 1.44 MG/DL (ref 0.55–1.02)
EOSINOPHIL # BLD AUTO: 0 X10(3)/MCL (ref 0–0.9)
EOSINOPHIL NFR BLD AUTO: 0 %
ERYTHROCYTE [DISTWIDTH] IN BLOOD BY AUTOMATED COUNT: 22.5 % (ref 11.5–17)
FLUAV AG UPPER RESP QL IA.RAPID: NEGATIVE
FLUAV AG UPPER RESP QL IA.RAPID: NOT DETECTED
FLUBV AG UPPER RESP QL IA.RAPID: NEGATIVE
FLUBV AG UPPER RESP QL IA.RAPID: NOT DETECTED
GLOBULIN SER-MCNC: 3.2 GM/DL (ref 2.4–3.5)
GLUCOSE SERPL-MCNC: 108 MG/DL (ref 74–106)
HCO3 UR-SCNC: 22.8 MMOL/L (ref 22–26)
HCT VFR BLD AUTO: 33.8 % (ref 37–47)
HGB BLD-MCNC: 9.3 GM/DL (ref 12–16)
INR PPP: 2.5 (ref 0–1.3)
LYMPHOCYTES # BLD AUTO: 0.8 X10(3)/MCL (ref 0.6–4.6)
LYMPHOCYTES NFR BLD AUTO: 4 %
MAGNESIUM SERPL-MCNC: 2.4 MG/DL (ref 1.8–2.4)
MCH RBC QN AUTO: 18.6 PG (ref 27–31)
MCHC RBC AUTO-ENTMCNC: 27.5 GM/DL (ref 33–36)
MCV RBC AUTO: 67.7 FL (ref 80–94)
MONOCYTES # BLD AUTO: 1.8 X10(3)/MCL (ref 0.1–1.3)
MONOCYTES NFR BLD AUTO: 8 %
NEUTROPHILS # BLD AUTO: 18.83 X10(3)/MCL (ref 2.1–9.2)
NEUTROPHILS NFR BLD AUTO: 87 %
O2 HGB ARTERIAL: 87.7 % (ref 94–97)
PCO2 BLDA: 36 MMHG (ref 35–45)
PH SMN: 7.41 [PH] (ref 7.35–7.45)
PLATELET # BLD AUTO: 283 X10(3)/MCL (ref 130–400)
PMV BLD AUTO: 9.6 FL (ref 9.4–12.4)
PO2 BLDA: 53 MMHG (ref 80–100)
POC ALLENS TEST: POSITIVE
POC BE: -1.6 (ref -2–3)
POC CAO2: 11.4 ML/DL (ref 15.7–21.6)
POC CO HGB: 3.5 %
POC CO2: 23.9 MMOL/L (ref 22–27)
POC IONIZED CALCIUM: 1.11 MMOL/L (ref 1.12–1.23)
POC MET HGB: 1 % (ref 0.4–1.5)
POC SAMPLESOURCE: ABNORMAL
POC SATURATED O2: 87.4 % (ref 96–97)
POC SITE: ABNORMAL
POC THB: 9.2 GM/DL (ref 12–16)
POC TREATMENT: ABNORMAL
POTASSIUM BLD-SCNC: 4 MMOL/L (ref 3.6–5)
POTASSIUM SERPL-SCNC: 4.3 MMOL/L (ref 3.5–5.1)
PROT SERPL-MCNC: 6.4 GM/DL (ref 6.4–8.2)
PROTHROMBIN TIME: 27.5 SECOND(S) (ref 12–14)
RBC # BLD AUTO: 4.99 X10(6)/MCL (ref 4.2–5.4)
SETTING 1 ABG: ABNORMAL
SETTING 2 ABG: ABNORMAL
SETTING 3 ABG: ABNORMAL
SETTING 4 ABG: ABNORMAL
SODIUM BLD-SCNC: 137 MMOL/L (ref 137–145)
SODIUM SERPL-SCNC: 142 MMOL/L (ref 136–145)
TROPONIN I SERPL-MCNC: 0.1 NG/ML (ref 0.02–0.49)
WBC # SPEC AUTO: 21.8 X10(3)/MCL (ref 4.5–11.5)

## 2019-12-14 LAB
ABS NEUT (OLG): 13.96 X10(3)/MCL (ref 2.1–9.2)
BASOPHILS # BLD AUTO: 0 X10(3)/MCL (ref 0–0.2)
BASOPHILS NFR BLD AUTO: 0 %
BUN SERPL-MCNC: 30 MG/DL (ref 7–18)
CALCIUM SERPL-MCNC: 8.7 MG/DL (ref 8.5–10.1)
CHLORIDE SERPL-SCNC: 111 MMOL/L (ref 98–107)
CO2 SERPL-SCNC: 26 MMOL/L (ref 21–32)
CREAT SERPL-MCNC: 1.14 MG/DL (ref 0.55–1.02)
CREAT/UREA NIT SERPL: 26.3
EOSINOPHIL # BLD AUTO: 0.2 X10(3)/MCL (ref 0–0.9)
EOSINOPHIL NFR BLD AUTO: 1 %
ERYTHROCYTE [DISTWIDTH] IN BLOOD BY AUTOMATED COUNT: 22.7 % (ref 11.5–17)
GLUCOSE SERPL-MCNC: 96 MG/DL (ref 74–106)
HCT VFR BLD AUTO: 32.1 % (ref 37–47)
HGB BLD-MCNC: 9 GM/DL (ref 12–16)
LYMPHOCYTES # BLD AUTO: 1.3 X10(3)/MCL (ref 0.6–4.6)
LYMPHOCYTES NFR BLD AUTO: 7 %
MCH RBC QN AUTO: 18.8 PG (ref 27–31)
MCHC RBC AUTO-ENTMCNC: 28 GM/DL (ref 33–36)
MCV RBC AUTO: 67.2 FL (ref 80–94)
MONOCYTES # BLD AUTO: 1.8 X10(3)/MCL (ref 0.1–1.3)
MONOCYTES NFR BLD AUTO: 10 %
NEUTROPHILS # BLD AUTO: 13.96 X10(3)/MCL (ref 2.1–9.2)
NEUTROPHILS NFR BLD AUTO: 80 %
PLATELET # BLD AUTO: 272 X10(3)/MCL (ref 130–400)
PMV BLD AUTO: 9.1 FL (ref 9.4–12.4)
POTASSIUM SERPL-SCNC: 4.3 MMOL/L (ref 3.5–5.1)
RBC # BLD AUTO: 4.78 X10(6)/MCL (ref 4.2–5.4)
SODIUM SERPL-SCNC: 144 MMOL/L (ref 136–145)
WBC # SPEC AUTO: 17.4 X10(3)/MCL (ref 4.5–11.5)

## 2019-12-15 LAB
ANISOCYTOSIS BLD QL SMEAR: ABNORMAL
BUN SERPL-MCNC: 22 MG/DL (ref 7–18)
CALCIUM SERPL-MCNC: 8.5 MG/DL (ref 8.5–10.1)
CHLORIDE SERPL-SCNC: 112 MMOL/L (ref 98–107)
CO2 SERPL-SCNC: 28 MMOL/L (ref 21–32)
CREAT SERPL-MCNC: 1.09 MG/DL (ref 0.55–1.02)
CREAT/UREA NIT SERPL: 20.2
ERYTHROCYTE [DISTWIDTH] IN BLOOD BY AUTOMATED COUNT: 22.5 % (ref 11.5–17)
GLUCOSE SERPL-MCNC: 99 MG/DL (ref 74–106)
HCT VFR BLD AUTO: 32.7 % (ref 37–47)
HGB BLD-MCNC: 8.8 GM/DL (ref 12–16)
HYPOCHROMIA BLD QL SMEAR: ABNORMAL
MCH RBC QN AUTO: 18.3 PG (ref 27–31)
MCHC RBC AUTO-ENTMCNC: 26.9 GM/DL (ref 33–36)
MCV RBC AUTO: 68.1 FL (ref 80–94)
PLATELET # BLD AUTO: 299 X10(3)/MCL (ref 130–400)
PLATELET # BLD EST: NORMAL 10*3/UL
PMV BLD AUTO: 9.7 FL (ref 7.4–10.4)
POTASSIUM SERPL-SCNC: 3.9 MMOL/L (ref 3.5–5.1)
RBC # BLD AUTO: 4.8 X10(6)/MCL (ref 4.2–5.4)
RBC MORPH BLD: ABNORMAL
SODIUM SERPL-SCNC: 146 MMOL/L (ref 136–145)
WBC # SPEC AUTO: 12.9 X10(3)/MCL (ref 4.5–11.5)

## 2019-12-16 LAB
BUN SERPL-MCNC: 17 MG/DL (ref 7–18)
CALCIUM SERPL-MCNC: 8.8 MG/DL (ref 8.5–10.1)
CHLORIDE SERPL-SCNC: 112 MMOL/L (ref 98–107)
CO2 SERPL-SCNC: 21 MMOL/L (ref 21–32)
CREAT SERPL-MCNC: 0.86 MG/DL (ref 0.55–1.02)
CREAT/UREA NIT SERPL: 19.8
ERYTHROCYTE [DISTWIDTH] IN BLOOD BY AUTOMATED COUNT: 22.4 % (ref 11.5–17)
GLUCOSE SERPL-MCNC: 114 MG/DL (ref 74–106)
HCT VFR BLD AUTO: 33.4 % (ref 37–47)
HGB BLD-MCNC: 9.3 GM/DL (ref 12–16)
MCH RBC QN AUTO: 18.6 PG (ref 27–31)
MCHC RBC AUTO-ENTMCNC: 27.8 GM/DL (ref 33–36)
MCV RBC AUTO: 66.9 FL (ref 80–94)
PLATELET # BLD AUTO: 290 X10(3)/MCL (ref 130–400)
PMV BLD AUTO: 9.2 FL (ref 9.4–12.4)
POTASSIUM SERPL-SCNC: 4.3 MMOL/L (ref 3.5–5.1)
RBC # BLD AUTO: 4.99 X10(6)/MCL (ref 4.2–5.4)
SODIUM SERPL-SCNC: 142 MMOL/L (ref 136–145)
WBC # SPEC AUTO: 11 X10(3)/MCL (ref 4.5–11.5)

## 2019-12-17 LAB
BUN SERPL-MCNC: 33 MG/DL (ref 7–18)
CALCIUM SERPL-MCNC: 8.6 MG/DL (ref 8.5–10.1)
CHLORIDE SERPL-SCNC: 102 MMOL/L (ref 98–107)
CO2 SERPL-SCNC: 25 MMOL/L (ref 21–32)
CREAT SERPL-MCNC: 1.46 MG/DL (ref 0.55–1.02)
CREAT/UREA NIT SERPL: 22.6
ERYTHROCYTE [DISTWIDTH] IN BLOOD BY AUTOMATED COUNT: 22.5 % (ref 11.5–17)
GLUCOSE SERPL-MCNC: 128 MG/DL (ref 74–106)
HCT VFR BLD AUTO: 37.2 % (ref 37–47)
HGB BLD-MCNC: 10.5 GM/DL (ref 12–16)
MCH RBC QN AUTO: 18.6 PG (ref 27–31)
MCHC RBC AUTO-ENTMCNC: 28.2 GM/DL (ref 33–36)
MCV RBC AUTO: 65.8 FL (ref 80–94)
PLATELET # BLD AUTO: 379 X10(3)/MCL (ref 130–400)
PMV BLD AUTO: 9.7 FL (ref 9.4–12.4)
POTASSIUM SERPL-SCNC: 3.7 MMOL/L (ref 3.5–5.1)
RBC # BLD AUTO: 5.65 X10(6)/MCL (ref 4.2–5.4)
SODIUM SERPL-SCNC: 138 MMOL/L (ref 136–145)
WBC # SPEC AUTO: 18.3 X10(3)/MCL (ref 4.5–11.5)

## 2019-12-18 LAB
BUN SERPL-MCNC: 55 MG/DL (ref 7–18)
CALCIUM SERPL-MCNC: 8.8 MG/DL (ref 8.5–10.1)
CHLORIDE SERPL-SCNC: 100 MMOL/L (ref 98–107)
CO2 SERPL-SCNC: 28 MMOL/L (ref 21–32)
CREAT SERPL-MCNC: 2.03 MG/DL (ref 0.55–1.02)
CREAT/UREA NIT SERPL: 27.1
ERYTHROCYTE [DISTWIDTH] IN BLOOD BY AUTOMATED COUNT: 22.5 % (ref 11.5–17)
FINAL CULTURE: NORMAL
FINAL CULTURE: NORMAL
GLUCOSE SERPL-MCNC: 135 MG/DL (ref 74–106)
HCT VFR BLD AUTO: 33.5 % (ref 37–47)
HGB BLD-MCNC: 9.3 GM/DL (ref 12–16)
MCH RBC QN AUTO: 18.5 PG (ref 27–31)
MCHC RBC AUTO-ENTMCNC: 27.8 GM/DL (ref 33–36)
MCV RBC AUTO: 66.6 FL (ref 80–94)
PLATELET # BLD AUTO: 357 X10(3)/MCL (ref 130–400)
PMV BLD AUTO: 10 FL (ref 9.4–12.4)
POTASSIUM SERPL-SCNC: 4 MMOL/L (ref 3.5–5.1)
RBC # BLD AUTO: 5.03 X10(6)/MCL (ref 4.2–5.4)
SODIUM SERPL-SCNC: 136 MMOL/L (ref 136–145)
WBC # SPEC AUTO: 18.8 X10(3)/MCL (ref 4.5–11.5)

## 2019-12-19 LAB
BUN SERPL-MCNC: 51 MG/DL (ref 7–18)
CALCIUM SERPL-MCNC: 8.3 MG/DL (ref 8.5–10.1)
CHLORIDE SERPL-SCNC: 103 MMOL/L (ref 98–107)
CO2 SERPL-SCNC: 26 MMOL/L (ref 21–32)
CREAT SERPL-MCNC: 1.55 MG/DL (ref 0.55–1.02)
CREAT/UREA NIT SERPL: 32.9
GLUCOSE SERPL-MCNC: 88 MG/DL (ref 74–106)
POTASSIUM SERPL-SCNC: 3.9 MMOL/L (ref 3.5–5.1)
SODIUM SERPL-SCNC: 137 MMOL/L (ref 136–145)

## 2019-12-20 LAB
BUN SERPL-MCNC: 51 MG/DL (ref 7–18)
CALCIUM SERPL-MCNC: 9.2 MG/DL (ref 8.5–10.1)
CHLORIDE SERPL-SCNC: 106 MMOL/L (ref 98–107)
CO2 SERPL-SCNC: 26 MMOL/L (ref 21–32)
CREAT SERPL-MCNC: 1.26 MG/DL (ref 0.55–1.02)
CREAT/UREA NIT SERPL: 40.5
ERYTHROCYTE [DISTWIDTH] IN BLOOD BY AUTOMATED COUNT: 22.1 % (ref 11.5–17)
GLUCOSE SERPL-MCNC: 111 MG/DL (ref 74–106)
HCT VFR BLD AUTO: 31.3 % (ref 37–47)
HGB BLD-MCNC: 8.8 GM/DL (ref 12–16)
MCH RBC QN AUTO: 18.5 PG (ref 27–31)
MCHC RBC AUTO-ENTMCNC: 28.1 GM/DL (ref 33–36)
MCV RBC AUTO: 65.9 FL (ref 80–94)
PLATELET # BLD AUTO: 415 X10(3)/MCL (ref 130–400)
PMV BLD AUTO: 9.9 FL (ref 9.4–12.4)
POTASSIUM SERPL-SCNC: 4.2 MMOL/L (ref 3.5–5.1)
RBC # BLD AUTO: 4.75 X10(6)/MCL (ref 4.2–5.4)
SODIUM SERPL-SCNC: 140 MMOL/L (ref 136–145)
WBC # SPEC AUTO: 13 X10(3)/MCL (ref 4.5–11.5)

## 2022-09-13 NOTE — HISTORICAL [facility] CERNER
This is a historical note converted from [place]. Formatting and pictures may have been removed.  Please reference [place] for original formatting and attached multimedia. Chief Complaint  recheck c/o left rib pain. painful cough w/ chest congestion x 3-4 days  History of Present Illness  Patient is an 84-year-old female who presents to the clinic with coughing with mucus production. ?Notably, she fell, hit the left side of her rib cage about 6 days ago, presented here?and received a chest x-ray with rib views, no rib fracture noted, chest x-ray clear. ?She started coughing a couple of days ago, she also visited her PCP a couple of days ago for recheck.? She was not coughing then. ?She has not short of breath, she does?have?oxygen at home that she uses, her typical?pulse ox is approximately 92-93%.  Review of Systems  Constitutional_no fatigue, no weakness  ENMT_no sore throat, no ear pain, no sinus pain/congestion  Respiratory_cough, mucus production  Cardiovascular_no chest pain, no palpitations, no edema  Gastrointestinal_no nausea, vomiting, or diarrhea. No abdominal pain  Integumentary_no skin rash or abnormal lesion  ?  ?  Physical Exam  Vitals & Measurements  T:?36.4? ?C ?(Oral)? HR:?89?(Peripheral)? RR:?19? BP:?175/75? SpO2:?92%?  HT:?170.18?cm? HT:?170.18?cm? WT:?80.1?kg? WT:?80.1?kg? BMI:?27.66?  General_well-developed well-nourished in no acute distress  Eye_clear conjunctiva, eyelids normal  HENT_TMs/ear canals clear, oropharynx without erythema/exudate, oropharynx and nasal mucosal surfaces moist, no maxillary/frontal sinus tenderness to palpation  Neck_full range of motion, no thyromegaly or lymphadenopathy  Respiratory_clear to auscultation bilaterally  Cardiovascular_regular rate and rhythm without murmurs, gallops or rubs  ?  Assessment/Plan  1.?COPD exacerbation  Ordered:  azithromycin, 250 mg tablets, Oral, Daily, Take 2 by mouth for one day, then 1 by mouth daily for 4 days, X 5 day(s), # 6 tab(s),  0 Refill(s), Pharmacy: [facility]/pharmacy #[id number]  benzonatate, 200 mg = 1 cap(s), Oral, TID, Use as needed for coughing, X 10 day(s), # 30 cap(s), 1 Refill(s), Pharmacy: [facility]/pharmacy #[id number]  XR Chest 2 Views, Routine, 02/10/18 17:00:00 CST, Cough, None, Stretcher, Rad Type, COPD exacerbation, Not Scheduled, 02/10/18 17:00:00 CST  ?  Start antibiotic today,?use over-the-counter medications for pain  We will contact with x-ray report when available  Contact this clinic or primary care physician for any further significant problems   Problem List/Past Medical History  Ongoing  Abrasion of right hand  Anxiety depression  arthritis  Asthma  Cardiac arrhythmia  COPD, mild  Glaucoma  High cholesterol  Hypertension  Hypothyroidism  Influenza A  Left rib fracture  Migraines  Mucous cyst  Seasonal allergies  Historical  Bilateral cataracts  Recurrent sinus infections  Sleep apnea  Procedure/Surgical History  Excision of lesion of tendon sheath of hand (08/23/2012)  Excision of lesion of tendon sheath or joint capsule (eg, cyst, mucous cyst, or ganglion), hand or finger. (08/23/2012)  Cataract surgery  Cholecystectomy  FESS - Functional endoscopic sinus surgery  Hip replacement  Hysterectomy  stent in leg  Tonsillectomy  Medications  alPRAzolam 0.5 mg oral tablet, 0.5 mg= 1 tab(s), Oral, At Bedtime, 2 refills  amiodarone 200 mg oral Tab, 0.5 tab, Oral, BID  aspirin 325 mg oral Delayed Release (EC) tablet, 325 mg= 1 tab(s), Oral, Daily  atorvastatin 10 mg oral tablet, 10 mg= 1 tab(s), Oral, Daily, 1 refills  Avapro 300 mg oral tablet, 300 mg= 1 tab(s), Oral, Daily, 1 refills  azithromycin 250 mg oral tablet, 250 mg tablets, Oral, Daily  cilostazol 50 mg oral tablet, 50 mg= 1 tab(s), Oral, BID  Clarinex 5 mg oral tablet, 5 mg= 1 tab(s), Oral, qAM, 1 refills  ELIQUIS TAB 2.5MG, 2.5 mg= 1 tab(s), Oral, BID  FLUTICASONE SPR 50MCG, 2 spray(s), Both Nostrils, Daily  GABAPENTIN CAP 100MG  Home oxygen therapy, 2 lpm, INH, As  Directed  levothyroxine 75 mcg (0.075 mg) oral tablet, 75 mcg= 1 tab(s), Oral, Daily  MECLIZINE TAB 25MG  mupirocin 2% topical ointment, 1 jaqui, TOP, TID, 1 refills,? ?Not taking  Norco 5 mg-325 mg oral tablet, 1 tab(s), Oral, q6hr, PRN  Norvasc 5 mg oral tablet, 5 mg= 1 tab(s), Oral, Daily  omeprazole 40 mg oral DR capsule, 40 mg= 1 cap(s), Oral, Daily, 1 refills  PreserVision oral tablet, 2 tab(s), Daily  ProAir HFA 90 mcg/inh inhalation aerosol with adapter, 2 puff(s), INH, q6hr, PRN, 3 refills  sertraline 50 mg oral tablet, 50 mg= 1 tab(s), Oral, Daily, 1 refills  Singulair 10 mg oral TABLET, 10 mg= 1 tab(s), Oral, qPM, 1 refills  Symbicort 160 mcg-4.5 mcg/inh inhalation aerosol, 2 puff(s), INH, BID  Tessalon 200 mg oral capsule, 200 mg= 1 cap(s), Oral, TID, 1 refills  Tessalon 200 mg oral capsule, 200 mg= 1 cap(s), Oral, TID  torsemide 20 mg oral tablet, Oral, Daily  Viactiv Calcium Flavor Glides, Oral, Daily  Zantac 150 oral tablet, 150 mg= 1 tab(s), Oral, Once a day (at bedtime)  Allergies  No Known Allergies  Social History  Alcohol - 10/23/2017  Current, Wine, Liquor, 1-2 times per month  Employment/School - 10/23/2017  Highest education level: High school.  Exercise - 10/23/2017  Exercise type: Walking.  Home/Environment - 10/23/2017  Lives with Alone. Home equipment: CPAP/BiPAP, Oxygen.  Nutrition/Health - 10/23/2017  Ideal Protien  Sexual - 10/23/2017  Sexually active: No.  Substance Abuse - Denies Substance Abuse, 10/23/2017  Never  Tobacco - Denies Tobacco Use, 10/23/2017  Former smoker  Current, Cigarettes, 30 per day. 30 year(s). Started age 16.0 Years.  Family History  Congestive heart disease.: Mother.  Diabetes mellitus type 2: Mother.  Heart disease: Father.  Diagnostic Results  Chest x-ray shows no change from previous x-ray, some haziness in the right middle lobe area?but no wolf infiltrates?or effusions

## 2022-09-13 NOTE — HISTORICAL [facility] CERNER
This is a historical note converted from [place]. Formatting and pictures may have been removed.  Please reference [place] for original formatting and attached multimedia. Chief Complaint  pt. states she woke up feeling SOB this am, hx COPD, on 2L NC oxygen all the time; caregiver states that Dr. Vega told her to come to ER and increase oxygen level if oxygen sats dropped below 90%; oxygen sats 88% this am; pt. on 3L NC currently  History of Present Illness  History of Present Illness?  Pt is an 85?year old?elderly white female with?PMH of COPD, oxygen dependent, HTN, and HLD presents to the ED with her family and caregiver, c/o?dyspnea that started this morning (2/22/19). Pt reports that she woke up short of breath but states she is feeling better currently. She notes that it was worse when she tried to get up and walk around the house. Pt is normally on O2 at?2L while at home and states that 90% SpO2 is normal for her. She denies any fever, dysuria, cough, N/V, or edema. Pt states that she has also been experiencing rhinorrhea. Pt was hospitalized on 1/1/19. Caregiver notes that?pt was on a Z-pack and corticosteroids 2 weeks ago.? ED evaluation including CXR?was unremarkable.? Pt was very promptly admitted to the hospitalist service.  Review of Systems  Except as documented all systems were reviewed and are negative.  Physical Exam  Vitals & Measurements  T:?36.5? ?C (Oral)? TMIN:?36.5? ?C (Oral)? TMAX:?36.8? ?C (Temporal Artery)? HR:?81(Peripheral)? RR:?18? BP:?157/66? SpO2:?93%? WT:?63.5?kg?  General: ?Alert, no acute distress,?Elderly white female, Not acutely?ill-appearing,?  Skin: ?Warm, dry, no rash, normal for ethnicity?  Head: ?Normocephalic, atraumatic. ?  Neck: ?Supple, trachea midline, no tenderness, no JVD, no carotid bruit. ?  Eye: ?Pupils are equal, round and reactive to light, extraocular movements are intact. ?  Ears, nose, mouth and throat:??oral mucosa moist, no pharyngeal erythema or  exudate.  Cardiovascular: ?Regular rate and rhythm  Respiratory: ?Lungs are clear to auscultation, respirations are non-labored, breath sounds are equal,?prolonged?expiratory phase with?no wheezes noted.. ?  Chest wall: ?No tenderness.?  Back: ?Nontender, Normal range of motion, Normal alignment, no step-offs. ?  Musculoskeletal:??No deformity. ?  Gastrointestinal: ?Soft, Nontender, Non distended, Normal bowel sounds, No organomegaly?  Genitourinary: ?no CVA tenderness.?  Neurological: ?Alert and oriented to person, place, time, and situation, No focal neurological deficit observed, CN II-XII intact, normal sensory observed, normal motor observed, normal speech observed, normal coordination observed. ?  Lymphatics: ?No lymphadenopathy.?  Psychiatric: ?Cooperative, appropriate mood & affect, normal judgment.  Assessment/Plan  1.?Acute on chronic respiratory failure with hypoxia?J96.21  2.?COPD exacerbation?J44.1  3.?Essential hypertension?I10  4.?Hypothyroidism?E03.9  5.?Cardiac arrhythmia?I49.9  PAF  on Eliquis  ?  Plan:  Continue empiric antibiotics, nebulized bronchodilators, supplemental oxygen, and corticosteroids.  Resume appropriate home meds.  ?   DVT prophylaxis: Eliquis  Code status: full  Patients physician(s): Gary GUERRERO, Gini Mcginnis MD, Brianna Ingram MD, Huan TURNER, Amanuel Napoles MD, Surekha Lechuga MD, Larry LEMUS  ?   Problem List/Past Medical History  Ongoing  Anxiety depression  arthritis  Cardiac arrhythmia  COPD, mild  Glaucoma  High cholesterol  History of rib fracture  Hypertension  Hypothyroidism  Migraines  Mucous cyst  Seasonal allergies  Historical  Abrasion of right hand  Bilateral cataracts  Influenza A  Left rib fracture  Recurrent sinus infections  Sleep apnea  Procedure/Surgical History  Cataract surgery  Cholecystectomy  FESS - Functional endoscopic sinus surgery  Hip replacement  Hysterectomy  stent in leg  Tonsillectomy   Medications  Inpatient  albuterol 2.5 mg/3 mL (0.083%) inhalation  solution, 2.5 mg= 3 mL, NEB, q4hr, PRN  alPRAzolam 0.5 mg oral tablet, 0.5 mg= 1 tab(s), Oral, At Bedtime  aspirin 81 mg oral Delayed Release (EC) tablet, 81 mg= 1 tab(s), Oral, Daily  atorvastatin 10 mg oral tablet, 10 mg= 1 tab(s), Oral, Daily  Breo Ellipta 100 mcg-25 mcg/inh inhalation powder, 1 inh, INH, Daily  cilostazol 50 mg oral tablet, 50 mg= 1 tab(s), Oral, At Bedtime  cloniDINE 0.2 mg oral tablet, 0.2 mg= 1 tab(s), Oral, q2hr, PRN  DuoNeb, 3 mL, NEB, q6hr Resp  Eliquis 2.5 mg oral tablet, 2.5 mg= 1 tab(s), Oral, BID  fluticasone 50 mcg/inh nasal spray, 100 mcg= 2 spray(s), Nasal, Daily  gabapentin 100 mg oral capsule, 200 mg= 2 cap(s), Oral, BID  Incruse Ellipta 62.5 mcg/inh inhalation powder, 62.5 mcg= 1 inh, INH, Daily  levofloxacin IVPB ( Levaquin ), 750 mg= 150 mL, IV Piggyback, q24hr  levothyroxine 88 mcg (0.088 mg) oral tablet, 0.088 mg= 1 tab(s), Oral, Daily  loratadine 10 mg oral tablet, 10 mg= 1 tab(s), Oral, Daily  losartan 50 mg oral tablet, 100 mg= 2 tab(s), Oral, Daily  metoprolol tartrate 25 mg oral tab, 25 mg= 1 tab(s), Oral, BID  montelukast 10 mg oral TABLET, 10 mg= 1 tab(s), Oral, qPM  Mylanta Max with Simethicone (400/400/40mg per 5mL) UD Susp, 15 mL, Oral, q4hr, PRN  Norco 10 mg-325 mg oral tablet, 1 tab(s), Oral, q4hr, PRN  Norco 5 mg-325 mg oral tablet, 1 tab(s), Oral, q4hr, PRN  Norvasc 5 mg oral tablet, 5 mg= 1 tab(s), Oral, Daily  Pepcid 20 mg oral tablet, 20 mg= 1 tab(s), Oral, Once a day (at bedtime)  Protonix 40 mg ORAL enteric coated tablet, 40 mg= 1 tab(s), Oral, Daily  sertraline 50 mg oral tablet, 50 mg= 1 tab(s), Oral, Daily  Solumedrol IV push / IM, 80 mg= 1.28 mL, IV Push, [id number]  Tessalon Perles, 200 mg= 2 cap(s), Oral, TID, PRN  Tylenol Extra Strength, 1000 mg= 2 tab(s), Oral, q6hr, PRN  Tylenol Extra Strength, 1000 mg= 2 tab(s), Oral, q6hr, PRN  Zofran 2 mg/mL injectable solution, 4 mg= 2 mL, IV Push, q4hr, PRN  Home  [facility] oral capsule, See  Instructions  alPRAzolam 0.5 mg oral tablet, See Instructions, 5 refills  aspirin 81 mg oral Delayed Release (EC) tablet, 81 mg= 1 tab(s), Oral, Daily  atorvastatin 10 mg oral tablet, See Instructions, 1 refills  benzonatate 100 mg oral capsule, 100 mg= 1 cap(s), Oral, q6hr, PRN  cilostazol 50 mg oral tablet, 50 mg= 1 tab(s), Oral, At Bedtime  DuoNeb 0.5 mg-2.5 mg/3 mL inhalation solution, 3 mL, INH, QID, 3 refills  ELIQUIS TAB 2.5MG, 2.5 mg= 1 tab(s), Oral, BID  fluticasone 50 mcg/inh nasal spray, See Instructions, 3 refills  gabapentin 100 mg oral capsule, 200 mg= 2 cap(s), Oral, BID  Home oxygen therapy, 2 lpm, INH, As Directed  irbesartan 300 mg oral tablet, See Instructions  levothyroxine 75 mcg (0.075 mg) oral tablet, See Instructions  loratadine 10 mg oral tablet, See Instructions  MECLIZINE TAB 25MG, PRN  metoprolol tartrate 25 mg oral tab, 25 mg= 1 tab(s), Oral, BID, 4 refills  montelukast 10 mg oral TABLET, See Instructions, 1 refills  Norvasc 5 mg oral tablet, 5 mg= 1 tab(s), Oral, Daily, 4 refills  omeprazole 40 mg oral DR capsule, See Instructions, 1 refills  prednisONE 20 mg oral tablet, See Instructions,? ?Not Taking, Completed Rx  PreserVision oral tablet, 2 tab(s), Daily,? ?Not taking  ProAir HFA 90 mcg/inh inhalation aerosol with adapter, 2 puff(s), INH, q6hr, PRN, 5 refills  sertraline 50 mg oral tablet, See Instructions, 1 refills  Symbicort 160 mcg-4.5 mcg/inh inhalation aerosol, See Instructions, 1 refills  torsemide 20 mg oral tablet, 20 mg= 1 tab(s), Oral, Daily, PRN  Tylenol Caplet 500 mg oral tablet, 1000 mg= 2 tab(s), Oral, q4hr, PRN  Zantac 150 oral tablet, 150 mg= 1 tab(s), Oral, Once a day (at bedtime)  Allergies  No Known Allergies  Social History  Alcohol  Current, Wine, Liquor, 1-2 times per month, 08/19/2012  Employment/School  Highest education level: High school., 08/19/2012  Exercise  Exercise type: Walking., 01/07/2016  Home/Environment  Lives with Alone. Home equipment:  CPAP/BiPAP, Oxygen., 10/23/2017  Nutrition/Health  Ideal Protien, 08/19/2012  Sexual  Sexually active: No., 01/07/2016  Substance Abuse - Denies Substance Abuse, 08/19/2012  Never, 01/07/2016  Tobacco - Denies Tobacco Use, 01/07/2016  Former smoker, quit more than 30 days ago, No, 02/11/2019  Family History  Congestive heart disease.: Mother.  Diabetes mellitus type 2: Mother.  Heart disease: Father.  Immunizations  Vaccine Date Status Comments   influenza virus vaccine, inactivated 10/03/2018 Given    pneumococcal vacc 01/2018 Recorded    influenza virus vaccine, inactivated 10/2017 Recorded Dr. John   tetanus-diphtheria toxoids 05/22/2016 Given    influenza virus vaccine, inactivated 2016 Recorded    Lab Results  Labs Last 24 Hours?  ?Chemistry? Hematology/Coagulation?   Sodium Lvl: 136 mmol/L (02/22/19 09:51:00) WBC:?14.9 x10(3)/mcL?High (02/22/19 09:51:00)   Potassium Lvl: 4.3 mmol/L (02/22/19 09:51:00) RBC: 5.29 x10(6)/mcL (02/22/19 09:51:00)   Chloride: 103 mmol/L (02/22/19 09:51:00) Hgb: 13.4 gm/dL (02/22/19 09:51:00)   CO2: 26 mmol/L (02/22/19 09:51:00) Hct: 43.7 % (02/22/19 09:51:00)   Calcium Lvl:?8.2 mg/dL?Low (02/22/19 09:51:00) Platelet: 282 x10(3)/mcL (02/22/19 09:51:00)   Glucose Lvl: 91 mg/dL (02/22/19 09:51:00) MCV: 82.6 fL (02/22/19 09:51:00)   BUN:?19 mg/dL?High (02/22/19 09:51:00) MCH:?25.3 pg?Low (02/22/19 09:51:00)   Creatinine: 0.99 mg/dL (02/22/19 09:51:00) MCHC:?30.7 gm/dL?Low (02/22/19 09:51:00)   eGFR-AA: >60 (02/22/19 09:51:00) RDW:?17.8 %?High (02/22/19 09:51:00)   eGFR-SANDRA: 56 mL/min/1.73 m2 (02/22/19 09:51:00) MPV: 9.5 fL (02/22/19 09:51:00)   Bili Total: 0.5 mg/dL (02/22/19 09:51:00) Abs Neut:?10.5 x10(3)/mcL?High (02/22/19 09:51:00)   Bili Direct: 0.1 mg/dL (02/22/19 09:51:00) Neutro Auto: 71 % (02/22/19 09:51:00)   Bili Indirect: 0.4 mg/dL (02/22/19 09:51:00) Lymph Auto: 10 % (02/22/19 09:51:00)   AST: 21 unit/L (02/22/19 09:51:00) Mono Auto: 12 % (02/22/19 09:51:00)   ALT: 16  unit/L (02/22/19 09:51:00) Eos Auto: 6 % (02/22/19 09:51:00)   Alk Phos: 63 unit/L (02/22/19 09:51:00) Abs Eos: 0.9 x10(3)/mcL (02/22/19 09:51:00)   Total Protein: 7 gm/dL (02/22/19 09:51:00) Basophil Auto: 0 % (02/22/19 09:51:00)   Albumin Lvl:?3.3 gm/dL?Low (02/22/19 09:51:00) Abs Neutro:?10.5 x10(3)/mcL?High (02/22/19 09:51:00)   Globulin:?3.7 gm/dL?High (02/22/19 09:51:00) Abs Lymph: 1.5 x10(3)/mcL (02/22/19 09:51:00)   A/G Ratio: 0.9 (02/22/19 09:51:00) Abs Mono:?1.8 x10(3)/mcL?High (02/22/19 09:51:00)   BNP:?161 pg/mL?High (02/22/19 09:51:00) Abs Baso: 0.1 x10(3)/mcL (02/22/19 09:51:00)   POC BNP iSTAT:?287 pg/mL?High (02/22/19 10:40:00)    POC Troponin: 0.01 ng/mL (02/22/19 10:08:00)    ?  ?  Diagnostic Results  Accession:?[id number]  Order:?XR Chest 2 Views  Report Dt/Tm:?02/22/2019 11:10  Report:?  Clinical History  Chest Pain  ?  Technique  2 views of the chest.  ?  Comparison  February 11, 2019  ?  Findings  Prominent interstitial markings with no focal opacification.  The trachea appears midline.  The cardiomediastinal silhouette is within normal limits.  There is no evidence of pneumothorax or pleural effusion.  Visualized abdomen, soft tissues, and osseous structures are  unremarkable.  ?  Impression  No acute cardiopulmonary process.

## 2022-09-13 NOTE — HISTORICAL [facility] CERNER
This is a historical note converted from [employer]. Formatting and pictures may have been removed.  Please reference [employer] for original formatting and attached multimedia. Chief Complaint  reports sob, tachypnea noted, weakness through night unable to get out of bed, unable to make to bathroom hx copd wears home O2 at 2 l  History of Present Illness  This is a 85-year-old  female with a history of stage II COPD (from PFTs done in November 2013) on 2 L home oxygen(? follows up with Dr.Gary Vega in pulm clinic, CT chest recently revealed persistent mediastinal adenopathy and recommended 3-6 months follow up CT ), hypertension, paroxysmal A. fib on a?eliquis at home, hyperlipidemia, peripheral vascular disease status post stent in right lower extremity several years ago, ANNE MARIE on CPAP, hypothyroidism, ex-smoker (60 pack years, quit smoking 30 years ago) presented to the ER complaining of right-sided throat pain with started yesterday evening.? Patient reports being in her usual state of health until yesterday evening when the throat pain started and gradually worsened, radiating to her right ear, associated with odynophagia and shortness of breath.? CT soft tissue neck revealed soft tissue thickening of the right pharynx, there is mild displacement towards the right.? No gross adenopathy appreciated.? There is loss of normal fat planes.? Findings concerning for malignancy until proven otherwise.? She was evaluated by ENT in the ER and underwent flexible nasopharyngoscopy which revealed generalized erythema in the whole hypopharyngeal region concerning for inflammatory/infectious process and was started on Rocephin, clindamycin and Decadron.? She is currently on 8 L via Ventimask satting 90-92%.? She is admitted to ICU for close monitoring. ?Patient reports?improving symptoms?after steroids and antibiotics.  Review of Systems  Constitutional: no fevers, night sweats, chills or fatigue  HEENT: no acute vision  changes or photophobia, no hearing loss, + throat pain and right ear pain  CVS: no chest pain, palpitations, or orthopnea  Resp:?+ SOB, no cough, or wheezing  GI: no abd pain, nausea, vomiting, diarrhea  : no dysuria, urinary incontinence  Musculoskeletal: no joint stiffness, pain, swelling or weakness  Skin: no rashes, lesions  Neuro: no headaches, seizures, numbness or syncope  Psych: no depression or anxiety  Physical Exam  Vitals & Measurements  T:?36.7? ?C (Oral)? TMIN:?36.7? ?C (Oral)? TMAX:?37.0? ?C (Oral)? HR:?112(Peripheral)? HR:?104(Monitored)? RR:?23? BP:?170/84? SpO2:?92%? WT:?82?kg?  General: AAOx3, NAD, alert and cooperative  HEENT: PERRLA, EOMI, normal conjunctiva, unable to perform throat exam due to pain aggravated with mouth opening  Neck: no LAD, no JVD, supple, no masses or thyromegaly  CVS: S1/S2 nml, tachycardic, regular, no murmurs, rubs or gallops  Resp: CTA B/L, no rhonchi, rales, or wheezing  GI: Not distended, not tender, BS+, no guarding  Skin: not jaundiced, warm, no rashes  Extremities: no peripheral edema, peripheral pulses intact  Neuro: CN II-XII grossly intact, strength and sensation symmetric and intact throughout, no focal neurological deficits.  Assessment/Plan  1. ?Mass?versus abscess of the right pharynx  2.? Acute on chronic?hypoxic?respiratory distress 2/2 #1- on 8L via ventimask  3.? COPD stage II- home O2 dependent  4. ?Peripheral vascular disease  5. ?Paroxysmal atrial fibrillation- on full dose anticoagulation at home  6.? hypertensive urgency- MAP was lowered appropriately after iv meds in the ER  7. ?Hyperlipidemia  8.? ANNE MARIE?on CPAP at home  9.? Hypothyroidism  10.? Leukocytosis  ?  This is a 85-year-old female admitted to ICU for acute?on chronic respiratory distress secondary to?mass versus?infection/abscess of the right pharynx. ?CT findings as described above, concerning for malignancy?until proven otherwise. ?She was evaluated by ENT?in the ER?and was started on  Rocephin, clindamycin and Decadron. ?She is currently on 8 L?via Ventimask. ?Ordered ABGs.?pt is NPO. ?Regarding her paroxysmal A. fib, she takes?eliquis at home,( reports last dose was yesterday )?started her on full dose lovenox. ?Patient originally reported that she is DNR/DNI, but?after further discussion in the ER,?she states that she is okay with intubation?temporarily?in case of airway compromise?but?remains DNR. will continue to monitor pt closely in the ICU. follow ENT recommendations.   Problem List/Past Medical History  Ongoing  Anxiety depression  arthritis  Cardiac arrhythmia  COPD, mild  Glaucoma  High cholesterol  History of rib fracture  Hypertension  Hypothyroidism  Migraines  Mucous cyst  Seasonal allergies  ?????? ANNE MARIE  Procedure/Surgical History  Cataract surgery  Cholecystectomy  FESS - Functional endoscopic sinus surgery  Hip replacement  Hysterectomy  stent in leg  Tonsillectomy   Medications  Home  [facility] oral capsule, See Instructions  alPRAzolam 0.5 mg oral tablet, See Instructions, 5 refills  aspirin 81 mg oral Delayed Release (EC) tablet, 81 mg= 1 tab(s), Oral, Daily  atorvastatin 10 mg oral tablet, See Instructions, 1 refills  benzonatate 100 mg oral capsule, 100 mg= 1 cap(s), Oral, q6hr, PRN  cilostazol 50 mg oral tablet, 50 mg= 1 tab(s), Oral, At Bedtime  DuoNeb 0.5 mg-2.5 mg/3 mL inhalation solution, 3 mL, INH, QID, 3 refills  ELIQUIS TAB 2.5MG, 2.5 mg= 1 tab(s), Oral, BID  fluticasone 50 mcg/inh nasal spray, See Instructions, 3 refills  gabapentin 100 mg oral capsule, 200 mg= 2 cap(s), Oral, BID  Home oxygen therapy, 2 lpm, INH, As Directed  irbesartan 300 mg oral tablet, See Instructions  levoFLOXacin 500 mg oral tablet, 500 mg= 1 tab(s), Oral, Daily  levothyroxine 75 mcg (0.075 mg) oral tablet, See Instructions  montelukast 10 mg oral TABLET, See Instructions, 1 refills  Norvasc 5 mg oral tablet, 5 mg= 1 tab(s), Oral, Daily, 4 refills  omeprazole 40 mg oral DR capsule,  See Instructions, 1 refills  prednisONE 10 mg oral tablet  ProAir HFA 90 mcg/inh inhalation aerosol with adapter, 2 puff(s), INH, q6hr, PRN, 5 refills  sertraline 50 mg oral tablet, See Instructions, 1 refills  Symbicort 160 mcg-4.5 mcg/inh inhalation aerosol, See Instructions, 4 refills  torsemide 20 mg oral tablet, 20 mg= 1 tab(s), Oral, Daily, PRN  Tylenol Caplet 500 mg oral tablet, 1000 mg= 2 tab(s), Oral, q4hr, PRN  umeclidinium 62.5 mcg/inh inhalation powder, 62.5 mcg= 1 inh, INH, Daily  Allergies  No Known Allergies  Social History  Alcohol  Current, Wine, Liquor, 1-2 times per month, 03/12/2019  Current, Wine, Liquor, 1-2 times per month, 08/19/2012  Employment/School  Highest education level: High school., 08/19/2012  Exercise  Exercise type: Walking., 01/07/2016  Home/Environment  Lives with Alone. Home equipment: CPAP/BiPAP, Oxygen., 10/23/2017  Nutrition/Health  Ideal Protien, 08/19/2012  Sexual  Sexually active: No., 01/07/2016  Substance Abuse - Denies Substance Abuse, 08/19/2012  Never, 01/07/2016  Tobacco - Denies Tobacco Use, 01/07/2016  Former smoker, quit more than 30 days ago, N/A, 03/12/2019  Former smoker, quit more than 30 days ago, No, 03/10/2019  Smoker, current status unknown, N/A, 02/22/2019  Former smoker, quit more than 30 days ago, No, 02/11/2019  Former smoker, quit more than 30 days ago, No, 02/11/2019  Former smoker, quit more than 30 days ago, No, 01/28/2019  Former smoker, quit more than 30 days ago, N/A, 12/31/2018  Former smoker, N/A, 12/14/2018  Former smoker, 01/07/2016  Current, Cigarettes, 30 per day. 30 year(s). Started age 16.0 Years., 08/19/2012  Family History  Congestive heart disease.: Mother.  Diabetes mellitus type 2: Mother.  Heart disease: Father.  Immunizations  Vaccine Date Status Comments   influenza virus vaccine, inactivated - Not Given Patient Refuses  pt recieved flu shot this year already   influenza virus vaccine, inactivated 10/03/2018 Given     pneumococcal vacc 01/2018 Recorded    influenza virus vaccine, inactivated 10/2017 Recorded Dr. John   tetanus-diphtheria toxoids 05/22/2016 Given    influenza virus vaccine, inactivated 2016 Recorded        Patient seen and examined?this a.m. ?She is awake and alert and states overall she is feeling much better.? She has been seen by Dr. Noah Machado this morning and is recommended humidification of the oxygen which we will do.  I have reviewed her CT scan of the chest?and one from 2018 as well as her?PET scan in 2018 and a previous CT scan in 2017. ?She has station 4?7?and?R?11 nodes?which have been enlarged and are stable?in size since 2017. ?Also she had a PET scan which showed?SUV of 2.5 and a subcarinal node and hypometabolism in the others. ?We have just expectantly watch these nodes and if not planning on doing biopsy. ?As they have been there for?several years now.? We will continue following recommendations of ENT?due to the concern of respiratory compromise or airway compromise we will keep her in ICU for an additional day.? Has been evaluated by speech therapy and will be?receiving therapy as she is unable to swallow at the present time due to the acute pharyngitis

## 2022-09-13 NOTE — HISTORICAL [facility] CERNER
This is a historical note converted from [employer]. Formatting and pictures may have been removed.  Please reference [employer] for original formatting and attached multimedia. Chief Complaint  Shortness of breath  History of Present Illness  PCP: Carlos Encinas MD  Advanced directive: None  CODE STATUS: Full  ?  Patient is a 85-year-old  female with a history of COPD on 4 L home O2, CHF, CVA no deficits, CKD, HTN.? She presents the emergency department with respiratory complaints.? States that she has been having increasing shortness of breath over the past 2 to 3 days with associated productive cough (yellow).? Also admits to dry mouth and sore throat for which she contributes to her NC.? She has been taking breathing treatments 3 times daily at home without much relief which prompted her visit to the emergency department.? Patient lives at home by herself, however has home health 24 hours.? She denies any other symptoms; no chest pain, dizziness, fever or chills.? Of note patient was recently admitted to [facility] for decompensated diastolic heart failure.? She was diuresed and DC to home.? On arrival to emergency department today she was hypoxic on 4 L nasal cannula, afebrile and normotensive.? Initial lab work notable for elevated BNP 1036 however this seems to be her baseline.? BUN/creatinine 38/1.4 and leukocytosis at 21.8.? ABG notable for hypoxemia at 53.? Influenza PCR was negative.? Chest x-ray was negative for any consolidations or effusions.? However patient appears to be clinically with pneumonia and per ED attending read right-sided pneumonia.? Patient was subsequently admitted to the hospitalist service for further management of HCAP and started on broad-spectrum IV antibiotics.  Review of Systems  Except as documented all systems reviewed and negative  Physical Exam  Vitals & Measurements  T:?36.9? ?C (Oral)? TMIN:?36.7? ?C (Temporal Artery)? TMAX:?36.9? ?C (Oral)? HR:?72(Peripheral)?  HR:?70(Monitored)? RR:?22? BP:?125/66? SpO2:?90%?  General:?well-developed well-nourished in no acute distress  Eye: PERRLA, EOMI, clear conjunctiva  HENT:?TMs/ear canals clear, oropharynx without erythema/exudate, moist mucus membranes  Neck: full range of motion, no thyromegaly or lymphadenopathy  Respiratory:?Decreased breath sounds?bilaterally,?rhonchi right lower lobe, no labored breathing  Cardiovascular:?regular rate and rhythm without murmurs, gallops or rubs  Gastrointestinal:?soft, non-tender, non-distended with normal bowel sounds, without masses to palpation  Genitourinary: no CVA tenderness to palpation  Musculoskeletal:?full range of motion of all extremities/spine without limitation or discomfort  Integumentary: no rashes or skin lesions present  Neurologic: cranial nerves intact, no signs of peripheral neurological deficit, motor/sensory function intact  ?   Medications (20) Active  Scheduled: (16)  alprazolam 0.5 mg Tab UD ?0.5 mg 1 tab(s), Oral, At Bedtime  apixaban 2.5 mg tablet ?5 mg 2 tab(s), Oral, BID  aspirin 81 mg EC Tab ?81 mg 1 tab(s), Oral, Daily  cyanocobalamin 500 mcg Tab ?1,000 mcg 2 tab(s), Oral, Daily  fluticasone nasal 0.05 mg/inh Spr ?100 mcg 2 spray(s), Both Nostrils, Daily  fluticasone-vilanterol 200 mcg-25 mcg/inh Powd ?1 inh, INH, Daily  gabapentin 100 mg Cap UD ?100 mg 1 cap(s), Oral, qPM  levofloxacin 750 mg/150 mL ?750 mg 150 mL, IV Piggyback, q48hr  levothyroxine 0.025 mg Tab UD ?75 mcg 3 tab(s), Oral, Daily  losartan 50 mg Tab UD ?100 mg 2 tab(s), Oral, Daily  metoprolol succ. 25 mg XL Tab ?25 mg 1 tab(s), Oral, BID  montelukast 10 mg Tab ?10 mg 1 tab(s), Oral, qPM  pantoprazole 40 mg EC Tab ?40 mg 1 tab(s), Oral, Daily  piperacillin-tazobactam ?3.375 gm 1 EA, IV Piggyback, q8hr  vancomycin ?1 gm 1 EA, IV Piggyback, q24hr  vancomycin ?1,500 mg 1.5 EA, IV Piggyback, Once  Continuous: (0)  PRN: (4)  albuterol 0.083% Sol 3 mL UD ?2.5 mg 3 mL, NEB, q4hr Resp  guaifenesin 200  mg/10 mL Sol -UD 10mL ?200 mg 10 mL, Oral, q4hr  Nitroglycerin 0.4 mg TAB ?0.4 mg 1 tab(s), SL, q5min  ondansetron 2 mg/mL inj - 2mL ?4 mg 2 mL, IV Push, q4hr  Assessment/Plan  Healthcare associated pneumonia  Hypoxemia  Acute on chronic respiratory failure with hypoxia  COPD and acute exacerbation  Leukocytosis  Heart failure preserved ejection fraction-compensated  Atrial fibrillation on anticoagulation  Chronic kidney disease  HTN  TIA  Hypothyroidism  Hyperlipidemia  ?  Plan:  ?  Continue empiric treatment with?vancomycin/levofloxacin/Zosyn  Breathing treatments?PRN  Maintenance inhaler, steroid nasal spray  Continue supplemental oxygen, goal 93%  CPAP nightly  Follow-up sputum cultures  Supportive care  Resume medications as appropriate  ?  I, Ovidio Monson PA-C, have seen and discussed this patients case with Dr.?Garbutt GUERRERO  Please see addendum section and the rest of the note for further information on patient assessment and treatment  ?  ?   Patient seen/evaluated personally by me. ?Case discussed with NP.? 85-year-old female with history of?COPD,?CHF, chronic respiratory failure-on home O2?admitted for complaints of?shortness of breath?and cough. ?Patient tells me she has had increased?cough with yellow sputum production over the last 3 to 4 days.? She denies having increased peripheral edema. ?She is taking torsemide every other day.? Chest x-ray done on admission?shows no?pulmonary edema, definite infiltrate.  ?   PE  Gen NAD  CV - RRR  Pulm -no wheeze, no crackles.? Decreased air movement bilaterally  Neuro - no focal deficits, CNs intact  ?   Agree with exam/plan as documented by NP except?feel the patient likely?has?bronchitis?other than?definite?pneumonia, given exam and chest x-ray.? Plan to continue on Levaquin?and Zosyn for now.? Would discontinue vancomycin.? Follow-up cultures.? Continue?inhalers. ?Resume on torsemide.? Wean O2 as tolerated. PT eval.   Problem List/Past Medical  History  Ongoing  Anxiety depression  arthritis  Asthma  Cardiac arrhythmia  Carotid artery disease  Chronic kidney disease, stage 3  Encounter for Medicare annual wellness exam  Glaucoma  High cholesterol  History of rib fracture  Hypertension  Hypothyroidism  Intertrigo  Mediastinal adenopathy  Migraines  Moderate COPD (chronic obstructive pulmonary disease)  Mucous cyst  Seasonal allergies  Historical  Abrasion of right hand  Bilateral cataracts  COPD, mild  Influenza A  Left rib fracture  Recurrent sinus infections  Sleep apnea  Procedure/Surgical History  Excision of lesion of tendon sheath of hand (08/23/2012)  Excision of lesion of tendon sheath or joint capsule (eg, cyst, mucous cyst, or ganglion), hand or finger. (08/23/2012)  Cholecystectomy  FESS - Functional endoscopic sinus surgery  Hip replacement  Hysterectomy  stent in leg  Tonsillectomy   Medications  Inpatient  albuterol, 2.5 mg= 3 mL, NEB, q4hr Resp, PRN  alPRAzolam 0.5 mg oral tablet, 0.5 mg= 1 tab(s), Oral, At Bedtime  apixaban, 5 mg= 2 tab(s), Oral, BID  aspirin 81 mg oral Delayed Release (EC) tablet, 81 mg= 1 tab(s), Oral, Daily  fluticasone 50 mcg/inh nasal spray, 100 mcg= 2 spray(s), Both Nostrils, Daily  fluticasone-vilanterol 200 mcg-25 mcg/inh inhalation powder, 1 inh, INH, Daily  gabapentin 100 mg oral capsule, 100 mg= 1 cap(s), Oral, qPM  guaiFENesin, 200 mg= 10 mL, Oral, q4hr, PRN  Levaquin, 750 mg= 150 mL, IV Piggyback, q48hr  levothyroxine 25 mcg (0.025 mg) oral tablet, 75 mcg= 3 tab(s), Oral, Daily  losartan 50 mg oral tablet, 100 mg= 2 tab(s), Oral, Daily  metoprolol succinate 25 mg oral tablet, extended release, 25 mg= 1 tab(s), Oral, BID  montelukast 10 mg oral TABLET, 10 mg= 1 tab(s), Oral, qPM  nitroglycerin 0.4 mg sublingual TAB, 0.4 mg= 1 tab(s), SL, q5min, PRN  pantoprazole, 40 mg= 1 tab(s), Oral, Daily  vancomycin  vancomycin  Vitamin B-12 500 mcg oral tablet, 1000 mcg= 2 tab(s), Oral, Daily  Zofran, 4 mg= 2 mL, IV Push,  q4hr, PRN  Zosyn 3.375 gm (for IVPB)  Home  alPRAzolam 0.5 mg oral tablet, See Instructions, 5 refills  aspirin 81 mg oral Delayed Release (EC) tablet, 81 mg= 1 tab(s), Oral, Daily  benzonatate 200 mg oral capsule, See Instructions  clotrimazole 1% topical cream, 1 jaqui, TOP, BID  Eliquis Starter Pack for Treatment of DVT and PE 5 mg oral tablet, 5 mg= 1 tab(s), Oral, BID  fluticasone 50 mcg/inh nasal spray, See Instructions, 3 refills  gabapentin 100 mg oral capsule, 100 mg= 1 cap(s), Oral, qPM  gabapentin 100 mg oral capsule, 200 mg= 2 cap(s), Oral, qAM  irbesartan 300 mg oral tablet, See Instructions, 2 refills  levothyroxine 75 mcg (0.075 mg) oral tablet, See Instructions, 2 refills  Metoprolol Tartrate 25 mg oral tablet, See Instructions, 2 refills  montelukast 10 mg oral TABLET, See Instructions, 2 refills  nitroglycerin 0.4 mg sublingual spray, 1 spray(s), SL, q5min, PRN, 3 refills  omeprazole 40 mg oral DR capsule, See Instructions, 2 refills  ProAir HFA 90 mcg/inh inhalation aerosol with adapter, 2 puff(s), INH, q6hr, PRN, 5 refills  sertraline 50 mg oral tablet, See Instructions, 2 refills  Symbicort 160 mcg-4.5 mcg/inh inhalation aerosol, 2 puff(s), INH, BID  torsemide 20 mg oral tablet, 20 mg= 1 tab(s), Oral, Daily, PRN, 6 refills  Vitamin B-12 1000 mcg oral tablet, 1000 mcg= 1 tab(s), Oral, Daily  Allergies  No Known Allergies  Social History  Abuse/Neglect  No, 11/26/2019  Alcohol  Current, 1-2 times per year, 08/28/2019  Employment/School  Highest education level: High school., 08/19/2012  Exercise  Exercise type: Walking., 01/07/2016  Home/Environment  Lives with Alone. Home equipment: CPAP/BiPAP, Oxygen., 10/23/2017  Nutrition/Health  Ideal Protien, 08/19/2012  Sexual  Sexually active: No., 01/07/2016  Spiritual/Cultural  Islam, 06/12/2019  Substance Use - Denies Substance Abuse, 08/19/2012  Never, 08/28/2019  Tobacco - Denies Tobacco Use, 01/07/2016  Former smoker, quit more than 30 days ago,  No, 11/26/2019  Family History  Congestive heart disease.: Mother.  Diabetes mellitus type 2: Mother.  Heart disease: Father.  Immunizations  Vaccine Date Status Comments   influenza virus vaccine, inactivated 10/24/2019 Given pt tolerated well   influenza virus vaccine, inactivated - Not Given Patient Refuses     influenza virus vaccine, inactivated 10/03/2018 Given    pneumococcal vacc 01/2018 Recorded    influenza virus vaccine, inactivated 10/2017 Recorded Dr. John   tetanus-diphtheria toxoids 05/22/2016 Given    influenza virus vaccine, inactivated 2016 Recorded    Lab Results  Labs?(Last four charted values)  WBC ?????H?21.8???(DEC 13)  Hgb ?????L?9.3???(DEC 13)  Hct ?????L?33.8???(DEC 13)  Plt ?????283???(DEC 13)  Na ?????142???(DEC 13)  K ?????4.3???(DEC 13)  CO2 ?????24.0???(DEC 13)  Cl ?????106???(DEC 13)  Cr ?????H?1.44???(DEC 13)  BUN ?????H?38.0???(DEC 13)  Glucose Random ???????H?108???(DEC 13)  PT ?????H?27.5???(DEC 13)  INR ?????H?2.5???(DEC 13)  PTT ?????H?51.3???(DEC 13)  Diagnostic Results  Accession:?[id number]  Order:?XR Chest 1 View  Report Dt/Tm:?12/13/2019 10:04  Report:?  HISTORY: Dyspnea.  ?  EXAM: XR Chest 1 View.?  ?  PRIOR STUDY: Chest radiograph 11/10/2019.  ?  FINDINGS: Radiographic examination of the chest in a single view  demonstrates clear lungs without consolidation or effusion. There is  no pneumothorax. The cardiac silhouette is enlarged. There is no acute  osseous abnormality.  ?  IMPRESSION: Cardiomegaly without acute cardiopulmonary abnormality.

## 2022-09-13 NOTE — HISTORICAL [facility] CERNER
This is a historical note converted from Monty. Formatting and pictures may have been removed.  Please reference Monty for original formatting and attached multimedia. Chief Complaint  SOB and not feeling well this morning, went to clinic. oxygen saturation on room air noted at 77%, placed on 6L - ems called. pt with hx asthma and copd. has home o2 - wears as needed. Duoneb in route to ER. currently 96% on 6L/NC. sob is better after neb  History of Present Illness  84-year-old  female with significant history of stage III COPD, HTN, hypothyroidism, GERD, peripheral vascular disease, HLD presented to the ED with hypoxemia.? Patient reports she was not feeling quite well for the past several days with worsening shortness of breath, needing more than usual oxygen at home to maintain saturations, wheezing and 1 episode of vomiting yesterday.? No diarrhea or abdominal pain.? No fever, chills.? No sick contacts.? Patient also reports generalized weakness.? Given symptomatic worsening she decided to go to her PCP where her saturations were noted to be significantly low in 70s following which she was sent to the ED.? Upon initial evaluation in the ED patient was hypoxemic with elevated blood pressure.? Labs significant for mild renal insufficiency, hypokalemia and leukocytosis.? Lactic acid was within normal limits.? Chest x-ray with concerns for infectious process.? She was treated with nebulization, Solu-Medrol 125 mg ?1 and Levaquin in the ED and hospitalist team was consulted for admission  Review of Systems  Except as documented above, all systems reviewed and is negative  Physical Exam  Vitals & Measurements  T:?37.1? ?C (Oral)? HR:?82(Peripheral)? RR:?20? BP:?190/79? SpO2:?88%? WT:?77?kg? WT:?77?kg?  General: ?Alert and oriented, No acute distress. ?  Eye: ?Pupils are equal, round and reactive to light,?  HENT: ?Normocephalic,?  Neck: ?Supple. ?  Respiratory:??Scattered wheezes bilaterally?,Respirations  are non-labored. ?  Cardiovascular: ?Normal rate, Regular rhythm, No edema. ?  Gastrointestinal: ?Soft, Non-tender, Normal bowel sounds  Integumentary: ?Warm,?  Neurologic: ?Alert, Oriented, no focal deficits  Psychiatric: ?Cooperative,??  ?  ?   Labs Last 24 Hours?  ?Chemistry? Hematology/Coagulation?   Sodium Lvl: 139 mmol/L (12/31/18 14:00:00) WBC:?21.5 x10(3)/mcL?High (12/31/18 14:00:00)   Potassium Lvl:?3.4 mmol/L?Low (12/31/18 14:00:00) RBC:?5.46 x10(6)/mcL?High (12/31/18 14:00:00)   Chloride: 105 mmol/L (12/31/18 14:00:00) Hgb: 13.8 gm/dL (12/31/18 14:00:00)   CO2: 26 mmol/L (12/31/18 14:00:00) Hct: 44.9 % (12/31/18 14:00:00)   Calcium Lvl: 8.7 mg/dL (12/31/18 14:00:00) Platelet: 238 x10(3)/mcL (12/31/18 14:00:00)   Glucose Lvl:?108 mg/dL?High (12/31/18 14:00:00) MCV: 82.2 fL (12/31/18 14:00:00)   BUN:?22 mg/dL?High (12/31/18 14:00:00) MCH:?25.3 pg?Low (12/31/18 14:00:00)   Creatinine:?1.16 mg/dL?High (12/31/18 14:00:00) MCHC:?30.7 gm/dL?Low (12/31/18 14:00:00)   eGFR-AA: 57 mL/min/1.73 m2 (12/31/18 14:00:00) RDW:?18 %?High (12/31/18 14:00:00)   eGFR-SANDRA: 47 mL/min/1.73 m2 (12/31/18 14:00:00) MPV: 9.5 fL (12/31/18 14:00:00)   Bili Total: 0.8 mg/dL (12/31/18 14:00:00) Abs Neut:?17.08 x10(3)/mcL?High (12/31/18 14:00:00)   Bili Direct: 0.2 mg/dL (12/31/18 14:00:00) Neutro Auto: 79 % (12/31/18 14:00:00)   Bili Indirect: 0.6 mg/dL (12/31/18 14:00:00) Lymph Auto: 11 % (12/31/18 14:00:00)   AST:?14 unit/L?Low (12/31/18 14:00:00) Mono Auto: 8 % (12/31/18 14:00:00)   ALT: 13 unit/L (12/31/18 14:00:00) Basophil Auto: 0 % (12/31/18 14:00:00)   Alk Phos: 75 unit/L (12/31/18 14:00:00) Abs Neutro:?17.08 x10(3)/mcL?High (12/31/18 14:00:00)   Total Protein: 7.4 gm/dL (12/31/18 14:00:00) Abs Lymph: 2.4 x10(3)/mcL (12/31/18 14:00:00)   Albumin Lvl: 3.5 gm/dL (12/31/18 14:00:00) Abs Mono:?1.8 x10(3)/mcL?High (12/31/18 14:00:00)   Globulin:?3.9 gm/dL?High (12/31/18 14:00:00) Abs Baso: 0 x10(3)/mcL (12/31/18 14:00:00)   A/G  Ratio:?0.9 ratio?Low (12/31/18 14:00:00) ?   BNP:?527 pg/mL?High (12/31/18 14:00:00) ?   Lactic Acid Lvl: 1.5 mmol/L (12/31/18 14:00:00) ?   Troponin-I: <0.02 (12/31/18 14:00:00) ?   ?  ?CXR  Impression  Increased interstitial markings of the right lower lobe are unchanged.  Infectious process is not excludedFindings, however as these are  unchanged possibly related to scarring.  ?  ?  ?  Assessment/Plan  ?  Acute on chronic hypoxemic respiratory failure  History secondary to suspected pneumonia combined with COPD he is a patient  Continue supplement locks and to maintain saturations above 92%  ?  Community-acquired pneumonia-right lower lobe  Continue IV Levaquin  Blood cultures ?2 obtained  Will obtain influenza A and B PCR  ?  Hypertensive urgency  When necessary labetalol/clonidine ordered  Resumed home antihypertensives-losartan  Add metoprolol 25 mg twice daily  ?  Hypokalemia  k DUR 20 mEq ?1  ?  Leukocytosis  Most likely secondary to suspected pneumonia  Continue to monitor  On appropriate antibiotics  ?  Mild acute kidney injury  Normal saline at 75 mL per hour  Continue close monitoring  ?  Hypothyroidism  Continue levothyroxine  ?  GERD  On PPI  ?  Peripheral vascular disease  She is on Eliquis and statin  ?  HLD  On Statin  ?  Prophylaxis  DVT- On Eliquis   Problem List/Past Medical History  Ongoing  Abrasion of right hand  Anxiety depression  arthritis  Asthma  Cardiac arrhythmia  COPD, mild  Glaucoma  High cholesterol  Hypertension  Hypothyroidism  Influenza A  Left rib fracture  Migraines  Mucous cyst  Seasonal allergies  Historical  Bilateral cataracts  Recurrent sinus infections  Sleep apnea  Procedure/Surgical History  Cataract surgery  Cholecystectomy  FESS - Functional endoscopic sinus surgery  Hip replacement  Hysterectomy  stent in leg  Tonsillectomy   Medications  Inpatient  acetaminophen, 650 mg= 20.3 mL, Oral, q6hr, PRN  albuterol 2.5 mg/3 mL (0.083%) inhalation solution, 2.5 mg= 3 mL, NEB,  Once  alPRAzolam 0.5 mg oral tablet, 0.5 mg= 1 tab(s), Oral, Daily  aspirin 81 mg oral Delayed Release (EC) tablet, 81 mg= 1 tab(s), Oral, Daily  atorvastatin 10 mg oral tablet, 10 mg= 1 tab(s), Oral, Daily  cilostazol 50 mg oral tablet, 50 mg= 1 tab(s), Oral, At Bedtime  cloNIDine, 0.2 mg= 1 tab(s), Oral, q8hr, PRN  Eliquis 2.5 mg oral tablet, 2.5 mg= 1 tab(s), Oral, BID  fluticasone-vilanterol 200 mcg-25 mcg/inh inhalation powder, 1 inh, INH, Daily  gabapentin 100 mg oral capsule, 200 mg= 2 cap(s), Oral, BID  labetalol, 20 mg= 4 mL, IV Push, q2hr, PRN  levofloxacin IVPB ( Levaquin ), 750 mg= 150 mL, IV Piggyback, q24hr  levothyroxine 25 mcg (0.025 mg) oral tablet, 75 mcg= 3 tab(s), Oral, Daily  losartan 50 mg oral tablet, 100 mg= 2 tab(s), Oral, Daily  Norco 5 mg-325 mg oral tablet, 1 tab(s), Oral, q6hr, PRN  Protonix, 40 mg= 1 tab(s), Oral, Daily  sertraline 50 mg oral tablet, 50 mg= 1 tab(s), Oral, Daily  Sodium Chloride 0.9% intravenous solution 1,000 mL, 1000 mL, IV  Solumedrol IV push / IM, 80 mg= 1.28 mL, IV Push, [id number]  Zofran, 4 mg= 2 mL, IV Push, q4hr, PRN  Home  alPRAzolam 0.5 mg oral tablet, See Instructions, 4 refills  aspirin 81 mg oral Delayed Release (EC) tablet, 81 mg= 1 tab(s), Oral, Daily  atorvastatin 10 mg oral tablet, See Instructions, 1 refills  cilostazol 50 mg oral tablet, 50 mg= 1 tab(s), Oral, At Bedtime  Clarinex 5 mg oral tablet, 5 mg= 1 tab(s), Oral, qAM, 1 refills,? ?Not Taking, Completed Rx  ELIQUIS TAB 2.5MG, 2.5 mg= 1 tab(s), Oral, BID  fluticasone 50 mcg/inh nasal spray, See Instructions, 3 refills  gabapentin 100 mg oral capsule, 200 mg= 2 cap(s), Oral, BID  Home oxygen therapy, 2 lpm, INH, As Directed  irbesartan 300 mg oral tablet, See Instructions  levothyroxine 75 mcg (0.075 mg) oral tablet, See Instructions  loratadine 10 mg oral tablet, 10 mg= 1 tab(s), Oral, Daily, 1 refills,? ?Not Taking per Prescriber  MECLIZINE TAB 25MG  montelukast 10 mg oral TABLET, See  Instructions, 1 refills  Norco 5 mg-325 mg oral tablet, 1 tab(s), Oral, q6hr, PRN,? ?Not Taking, Completed Rx  omeprazole 40 mg oral DR capsule, See Instructions, 1 refills  PreserVision oral tablet, 2 tab(s), Daily  ProAir HFA 90 mcg/inh inhalation aerosol with adapter, 2 puff(s), INH, q6hr, PRN, 5 refills  sertraline 50 mg oral tablet, See Instructions, 1 refills  Symbicort 160 mcg-4.5 mcg/inh inhalation aerosol, See Instructions, 1 refills  Tessalon 200 mg oral capsule, 200 mg= 1 cap(s), Oral, TID  torsemide 20 mg oral tablet, Oral, Daily  Viactiv Calcium Flavor Glides, Oral, Daily  Zantac 150 oral tablet, 150 mg= 1 tab(s), Oral, Once a day (at bedtime)  Allergies  No Known Allergies  Social History  Alcohol  Current, Wine, Liquor, 1-2 times per month, 08/19/2012  Employment/School  Highest education level: High school., 08/19/2012  Exercise  Exercise type: Walking., 01/07/2016  Home/Environment  Lives with Alone. Home equipment: CPAP/BiPAP, Oxygen., 10/23/2017  Nutrition/Health  Ideal Protien, 08/19/2012  Sexual  Sexually active: No., 01/07/2016  Substance Abuse - Denies Substance Abuse, 08/19/2012  Never, 01/07/2016  Tobacco - Denies Tobacco Use, 01/07/2016  Former smoker, quit more than 30 days ago, N/A, 12/31/2018  Former smoker, N/A, 12/14/2018  Former smoker, 01/07/2016  Current, Cigarettes, 30 per day. 30 year(s). Started age 16.0 Years., 08/19/2012  Family History  Congestive heart disease.: Mother.  Diabetes mellitus type 2: Mother.  Heart disease: Father.  Immunizations  Vaccine Date Status Comments   influenza virus vaccine, inactivated 10/03/2018 Given    influenza virus vaccine, inactivated 10/2017 Recorded Dr. John   tetanus-diphtheria toxoids 05/22/2016 Given    influenza virus vaccine, inactivated 2016 Recorded

## 2022-09-15 ENCOUNTER — HISTORICAL (OUTPATIENT)
Dept: ADMINISTRATIVE | Facility: HOSPITAL | Age: 87
End: 2022-09-15